# Patient Record
Sex: FEMALE | Race: WHITE | NOT HISPANIC OR LATINO | Employment: FULL TIME | URBAN - METROPOLITAN AREA
[De-identification: names, ages, dates, MRNs, and addresses within clinical notes are randomized per-mention and may not be internally consistent; named-entity substitution may affect disease eponyms.]

---

## 2021-06-11 ENCOUNTER — HOSPITAL ENCOUNTER (INPATIENT)
Facility: MEDICAL CENTER | Age: 51
LOS: 2 days | DRG: 175 | End: 2021-06-13
Attending: EMERGENCY MEDICINE | Admitting: INTERNAL MEDICINE
Payer: COMMERCIAL

## 2021-06-11 ENCOUNTER — APPOINTMENT (OUTPATIENT)
Dept: CARDIOLOGY | Facility: MEDICAL CENTER | Age: 51
DRG: 175 | End: 2021-06-11
Attending: EMERGENCY MEDICINE
Payer: COMMERCIAL

## 2021-06-11 ENCOUNTER — HOSPITAL ENCOUNTER (OUTPATIENT)
Dept: RADIOLOGY | Facility: MEDICAL CENTER | Age: 51
End: 2021-06-11
Payer: COMMERCIAL

## 2021-06-11 ENCOUNTER — APPOINTMENT (OUTPATIENT)
Dept: RADIOLOGY | Facility: MEDICAL CENTER | Age: 51
DRG: 175 | End: 2021-06-11
Attending: INTERNAL MEDICINE
Payer: COMMERCIAL

## 2021-06-11 DIAGNOSIS — I26.92 ACUTE SADDLE PULMONARY EMBOLISM, UNSPECIFIED WHETHER ACUTE COR PULMONALE PRESENT (HCC): ICD-10-CM

## 2021-06-11 DIAGNOSIS — K85.90 ACUTE PANCREATITIS, UNSPECIFIED COMPLICATION STATUS, UNSPECIFIED PANCREATITIS TYPE: ICD-10-CM

## 2021-06-11 PROBLEM — I82.432 ACUTE DEEP VEIN THROMBOSIS (DVT) OF POPLITEAL VEIN OF LEFT LOWER EXTREMITY (HCC): Status: ACTIVE | Noted: 2021-06-01

## 2021-06-11 LAB
APTT PPP: 31.8 SEC (ref 24.7–36)
EKG IMPRESSION: NORMAL
EST. AVERAGE GLUCOSE BLD GHB EST-MCNC: 200 MG/DL
GLUCOSE BLD-MCNC: 179 MG/DL (ref 65–99)
HBA1C MFR BLD: 8.6 % (ref 4–5.6)
INR PPP: 1.25 (ref 0.87–1.13)
LV EJECT FRACT  99904: 70
LV EJECT FRACT MOD 2C 99903: 82.75
LV EJECT FRACT MOD 4C 99902: 71.2
LV EJECT FRACT MOD BP 99901: 77.64
NT-PROBNP SERPL IA-MCNC: 17 PG/ML (ref 0–125)
PROTHROMBIN TIME: 15.4 SEC (ref 12–14.6)
TRIGL SERPL-MCNC: 237 MG/DL (ref 0–149)
UFH PPP CHRO-ACNC: >1.1 IU/ML

## 2021-06-11 PROCEDURE — 84478 ASSAY OF TRIGLYCERIDES: CPT

## 2021-06-11 PROCEDURE — 700102 HCHG RX REV CODE 250 W/ 637 OVERRIDE(OP): Performed by: INTERNAL MEDICINE

## 2021-06-11 PROCEDURE — 99223 1ST HOSP IP/OBS HIGH 75: CPT | Mod: 25 | Performed by: INTERNAL MEDICINE

## 2021-06-11 PROCEDURE — 93005 ELECTROCARDIOGRAM TRACING: CPT | Performed by: EMERGENCY MEDICINE

## 2021-06-11 PROCEDURE — A9270 NON-COVERED ITEM OR SERVICE: HCPCS | Performed by: INTERNAL MEDICINE

## 2021-06-11 PROCEDURE — 99291 CRITICAL CARE FIRST HOUR: CPT | Performed by: INTERNAL MEDICINE

## 2021-06-11 PROCEDURE — 700105 HCHG RX REV CODE 258: Performed by: INTERNAL MEDICINE

## 2021-06-11 PROCEDURE — 700117 HCHG RX CONTRAST REV CODE 255: Performed by: EMERGENCY MEDICINE

## 2021-06-11 PROCEDURE — 82962 GLUCOSE BLOOD TEST: CPT

## 2021-06-11 PROCEDURE — 85610 PROTHROMBIN TIME: CPT

## 2021-06-11 PROCEDURE — 770022 HCHG ROOM/CARE - ICU (200)

## 2021-06-11 PROCEDURE — 85730 THROMBOPLASTIN TIME PARTIAL: CPT | Mod: 91

## 2021-06-11 PROCEDURE — 93010 ELECTROCARDIOGRAM REPORT: CPT | Performed by: INTERNAL MEDICINE

## 2021-06-11 PROCEDURE — 93306 TTE W/DOPPLER COMPLETE: CPT

## 2021-06-11 PROCEDURE — 85520 HEPARIN ASSAY: CPT

## 2021-06-11 PROCEDURE — 96365 THER/PROPH/DIAG IV INF INIT: CPT

## 2021-06-11 PROCEDURE — 93306 TTE W/DOPPLER COMPLETE: CPT | Mod: 26 | Performed by: INTERNAL MEDICINE

## 2021-06-11 PROCEDURE — 700111 HCHG RX REV CODE 636 W/ 250 OVERRIDE (IP): Performed by: EMERGENCY MEDICINE

## 2021-06-11 PROCEDURE — 99291 CRITICAL CARE FIRST HOUR: CPT

## 2021-06-11 PROCEDURE — 96366 THER/PROPH/DIAG IV INF ADDON: CPT

## 2021-06-11 PROCEDURE — 83880 ASSAY OF NATRIURETIC PEPTIDE: CPT

## 2021-06-11 PROCEDURE — 83036 HEMOGLOBIN GLYCOSYLATED A1C: CPT

## 2021-06-11 PROCEDURE — 76705 ECHO EXAM OF ABDOMEN: CPT

## 2021-06-11 PROCEDURE — 96375 TX/PRO/DX INJ NEW DRUG ADDON: CPT

## 2021-06-11 RX ORDER — AMOXICILLIN 250 MG
2 CAPSULE ORAL 2 TIMES DAILY
Status: DISCONTINUED | OUTPATIENT
Start: 2021-06-11 | End: 2021-06-13 | Stop reason: HOSPADM

## 2021-06-11 RX ORDER — OXYCODONE HYDROCHLORIDE 10 MG/1
10 TABLET ORAL
Status: DISCONTINUED | OUTPATIENT
Start: 2021-06-11 | End: 2021-06-13 | Stop reason: HOSPADM

## 2021-06-11 RX ORDER — OXYCODONE HYDROCHLORIDE 5 MG/1
5 TABLET ORAL
Status: DISCONTINUED | OUTPATIENT
Start: 2021-06-11 | End: 2021-06-13 | Stop reason: HOSPADM

## 2021-06-11 RX ORDER — SODIUM CHLORIDE, SODIUM LACTATE, POTASSIUM CHLORIDE, CALCIUM CHLORIDE 600; 310; 30; 20 MG/100ML; MG/100ML; MG/100ML; MG/100ML
INJECTION, SOLUTION INTRAVENOUS CONTINUOUS
Status: DISCONTINUED | OUTPATIENT
Start: 2021-06-11 | End: 2021-06-12

## 2021-06-11 RX ORDER — HYDROMORPHONE HYDROCHLORIDE 1 MG/ML
0.5 INJECTION, SOLUTION INTRAMUSCULAR; INTRAVENOUS; SUBCUTANEOUS
Status: DISCONTINUED | OUTPATIENT
Start: 2021-06-11 | End: 2021-06-13 | Stop reason: HOSPADM

## 2021-06-11 RX ORDER — DEXTROSE MONOHYDRATE 25 G/50ML
50 INJECTION, SOLUTION INTRAVENOUS
Status: DISCONTINUED | OUTPATIENT
Start: 2021-06-11 | End: 2021-06-12

## 2021-06-11 RX ORDER — HEPARIN SODIUM 1000 [USP'U]/ML
40 INJECTION, SOLUTION INTRAVENOUS; SUBCUTANEOUS PRN
Status: ACTIVE | OUTPATIENT
Start: 2021-06-11 | End: 2021-06-12

## 2021-06-11 RX ORDER — HEPARIN SODIUM 5000 [USP'U]/100ML
0-30 INJECTION, SOLUTION INTRAVENOUS CONTINUOUS
Status: DISPENSED | OUTPATIENT
Start: 2021-06-11 | End: 2021-06-12

## 2021-06-11 RX ORDER — IBUPROFEN 200 MG
600 TABLET ORAL
COMMUNITY

## 2021-06-11 RX ORDER — ACETAMINOPHEN 325 MG/1
650 TABLET ORAL EVERY 6 HOURS PRN
Status: DISCONTINUED | OUTPATIENT
Start: 2021-06-11 | End: 2021-06-13 | Stop reason: HOSPADM

## 2021-06-11 RX ORDER — INSULIN LISPRO 100 [IU]/ML
1-6 INJECTION, SOLUTION INTRAVENOUS; SUBCUTANEOUS EVERY 6 HOURS
Status: DISCONTINUED | OUTPATIENT
Start: 2021-06-11 | End: 2021-06-12

## 2021-06-11 RX ORDER — BISACODYL 10 MG
10 SUPPOSITORY, RECTAL RECTAL
Status: DISCONTINUED | OUTPATIENT
Start: 2021-06-11 | End: 2021-06-13 | Stop reason: HOSPADM

## 2021-06-11 RX ORDER — HEPARIN SODIUM 1000 [USP'U]/ML
80 INJECTION, SOLUTION INTRAVENOUS; SUBCUTANEOUS ONCE
Status: COMPLETED | OUTPATIENT
Start: 2021-06-11 | End: 2021-06-11

## 2021-06-11 RX ORDER — POLYETHYLENE GLYCOL 3350 17 G/17G
1 POWDER, FOR SOLUTION ORAL
Status: DISCONTINUED | OUTPATIENT
Start: 2021-06-11 | End: 2021-06-13 | Stop reason: HOSPADM

## 2021-06-11 RX ADMIN — HEPARIN SODIUM 10.94 UNITS/KG/HR: 5000 INJECTION, SOLUTION INTRAVENOUS at 15:42

## 2021-06-11 RX ADMIN — SODIUM CHLORIDE, POTASSIUM CHLORIDE, SODIUM LACTATE AND CALCIUM CHLORIDE: 600; 310; 30; 20 INJECTION, SOLUTION INTRAVENOUS at 22:44

## 2021-06-11 RX ADMIN — LIDOCAINE HYDROCHLORIDE 15 ML: 20 SOLUTION OROPHARYNGEAL at 17:12

## 2021-06-11 RX ADMIN — HEPARIN SODIUM 6600 UNITS: 1000 INJECTION, SOLUTION INTRAVENOUS; SUBCUTANEOUS at 15:45

## 2021-06-11 RX ADMIN — HUMAN ALBUMIN MICROSPHERES AND PERFLUTREN 3 ML: 10; .22 INJECTION, SOLUTION INTRAVENOUS at 16:15

## 2021-06-11 ASSESSMENT — ENCOUNTER SYMPTOMS
RESPIRATORY NEGATIVE: 1
CONSTITUTIONAL NEGATIVE: 1
TINGLING: 1
PALPITATIONS: 1
ABDOMINAL PAIN: 0
EYES NEGATIVE: 1
ORTHOPNEA: 0
BRUISES/BLEEDS EASILY: 0
HEARTBURN: 1
CHILLS: 0
VOMITING: 0
WEAKNESS: 0
CONSTIPATION: 0
FEVER: 0
PSYCHIATRIC NEGATIVE: 1
HEADACHES: 1
DIZZINESS: 1
GASTROINTESTINAL NEGATIVE: 1
MUSCULOSKELETAL NEGATIVE: 1
SEIZURES: 0
NAUSEA: 1

## 2021-06-11 ASSESSMENT — LIFESTYLE VARIABLES
DOES PATIENT WANT TO STOP DRINKING: NO
DO YOU DRINK ALCOHOL: NO

## 2021-06-11 ASSESSMENT — FIBROSIS 4 INDEX
FIB4 SCORE: 0.89
FIB4 SCORE: 0.73

## 2021-06-11 NOTE — H&P
Hospital Medicine History & Physical Note    Date of Service  6/11/2021    Primary Care Physician  QUINTON Palencia.    Consultants  Critical Care Dr. Garay  IR Dr. Keen    Code Status  Full Code    Chief Complaint  Chief Complaint   Patient presents with   • Pulmonary Embolism     BIB EMS from [facility] for a saddle PE. c/o SOB, tingling in left hand and right foot.        History of Presenting Illness  50 y.o. female who presented 6/11/2021 with history of endometriosis and hysterectomy in April 2021, LLE DVT diagnosed 5 days ago and compliant with Eliquis, who presented to the ED with sudden onset lightheadedness since this afternoon.   She initially presented to outside facility and underwent extensive work-up.  CTA revealed saddle pulmonary embolism with imaging evidence of right heart strain.  She was transported to [facility] ED for higher level of care.  Echocardiogram being performed in the emergency department. Dr. Keen, IR evaluating.    Her lightheadedness occurred suddenly while walking this afternoon.  It was accompanied by nausea, tingling in extremities, and headache which has now resolved.  Symptoms were worse with standing, activity, improved with sitting down.  She denies fall or loss of consciousness.     She reports that she was vaccinated for Covid approximately 1 month ago.      Review of Systems  Review of Systems   Constitutional: Positive for malaise/fatigue. Negative for chills and fever.   Cardiovascular: Positive for palpitations and leg swelling (left). Negative for chest pain and orthopnea.   Gastrointestinal: Positive for heartburn and nausea. Negative for abdominal pain, constipation and vomiting.   Neurological: Positive for dizziness and headaches. Negative for seizures and weakness.   Endo/Heme/Allergies: Does not bruise/bleed easily.   All other systems reviewed and are negative.      Past Medical History   has a past medical history of Diabetes (HCC), DVT (deep venous thrombosis)  ([facility]) (06/2021), Endometriosis, and Hypertension.    Surgical History   has a past surgical history that includes gyn surgery (04/2021); other orthopedic surgery; and other orthopedic surgery.     Family History  family history includes Cancer in her maternal grandmother; Diabetes in her brother, father, and maternal grandfather; Heart Disease in her paternal grandfather.     Social History   reports that she has never smoked. She has never used smokeless tobacco. She reports current alcohol use. She reports previous drug use.    Allergies  No Known Allergies    Medications  Prior to Admission Medications   Prescriptions Last Dose Informant Patient Reported? Taking?   Empagliflozin-metFORMIN HCl 12.5-500 MG Tab   Yes No   Sig: Take 1 Tablet 24 Hour Sustained Release by mouth every day.   SITagliptin (JANUVIA) 50 MG Tab   Yes No   Sig: Take 50 mg by mouth every day.   amLODIPine (NORVASC) 5 MG Tab   Yes No   Sig: Take 2.5 mg by mouth at bedtime.   apixaban (ELIQUIS) 5mg Tab   Yes No   Sig: Take 10 mg by mouth 2 times a day.   fenofibrate (TRICOR) 145 MG Tab   Yes No   Sig: Take 1 tablet by mouth at bedtime.   irbesartan (AVAPRO) 150 MG Tab   Yes No   Sig: Take 300 mg by mouth at bedtime.      Facility-Administered Medications: None       Physical Exam  Temp:  [36.3 °C (97.3 °F)] 36.3 °C (97.3 °F)  Pulse:  [] 108  Resp:  [16-20] 16  BP: (130-152)/(69-90) 152/89  SpO2:  [91 %-95 %] 94 %    Physical Exam  Vitals and nursing note reviewed.   Constitutional:       General: She is not in acute distress.     Appearance: She is obese. She is not ill-appearing or diaphoretic.   HENT:      Head: Normocephalic and atraumatic.      Right Ear: External ear normal.      Left Ear: External ear normal.      Nose: Nose normal.      Mouth/Throat:      Mouth: Mucous membranes are moist.      Pharynx: Oropharynx is clear.   Eyes:      General: No scleral icterus.     Conjunctiva/sclera: Conjunctivae normal.   Cardiovascular:       Rate and Rhythm: Regular rhythm. Tachycardia present.      Heart sounds: Murmur heard.   Systolic murmur is present.     Pulmonary:      Effort: Pulmonary effort is normal. No respiratory distress.      Breath sounds: Normal breath sounds. No wheezing or rales.   Chest:      Chest wall: No tenderness.   Abdominal:      General: There is no distension.      Palpations: Abdomen is soft.      Tenderness: There is no abdominal tenderness. There is no guarding.   Musculoskeletal:         General: No tenderness.      Cervical back: Normal range of motion and neck supple.      Right lower leg: Edema (mild) present.   Skin:     General: Skin is warm and dry.   Neurological:      General: No focal deficit present.      Mental Status: She is alert and oriented to person, place, and time.      Cranial Nerves: No cranial nerve deficit.      Motor: No weakness.   Psychiatric:         Mood and Affect: Mood normal.         Behavior: Behavior normal.         Laboratory:  Recent Labs     06/11/21  1154   WBC 9.0   RBC 5.40   HEMOGLOBIN 15.1   HEMATOCRIT 45.8   MCV 84.8   MCH 28.0   MCHC 33.0   RDW 12.7   PLATELETCT 271   MPV 9.5     Recent Labs     06/11/21  1154   SODIUM 139   POTASSIUM 3.5   CHLORIDE 102   CO2 24   GLUCOSE 220*   BUN 11   CREATININE 1.2*   CALCIUM 9.2     Recent Labs     06/11/21  1154   ALTSGPT 54   ASTSGOT 36   ALKPHOSPHAT 121*   TBILIRUBIN 0.6   LIPASE 1417*   GLUCOSE 220*         No results for input(s): NTPROBNP in the last 72 hours.      No results for input(s): TROPONINT in the last 72 hours.    Imaging:  EC-ECHOCARDIOGRAM COMPLETE W/ CONT   Final Result      OUTSIDE IMAGES-MR BRAIN   Final Result      OUTSIDE IMAGES-CT CHEST   Final Result      OUTSIDE IMAGES-CT ABDOMEN /PELVIS   Final Result      OUTSIDE IMAGES-CT HEAD   Final Result      OUTSIDE IMAGES-DX CHEST   Final Result            Assessment/Plan:  I anticipate this patient will require at least two midnights for appropriate medical management,  necessitating inpatient admission.    * Acute saddle pulmonary embolism (HCC)- (present on admission)  Assessment & Plan  Became symptomatic this afternoon, prompting presentation.  Evidence of right heart strain on imaging.  Echocardiogram pending.  Admit to ICU for close hemodynamic monitoring, possible EKOS.  Continue therapeutic anticoagulation.  Appreciate IR, intensivist recommendations.    Acute deep vein thrombosis (DVT) of popliteal vein of left lower extremity (HCC)- (present on admission)  Assessment & Plan  Left lower leg DVT diagnosed in 6/2021.  Neurovascularly intact.  Has been taking Eliquis consistently since diagnosis.  Continue therapeutic anticoagulation.  Monitor.    Elevated lipase- (present on admission)  Assessment & Plan  No epigastric tenderness or imaging evidence of pancreatitis, therefore does not meet criteria for diagnosis of pancreatitis.  No known gallstone disease, very rare EtOH.  Check triglycerides.  Trend lipase tomorrow morning.    Hypertension- (present on admission)  Assessment & Plan  Mildly hypertensive on presentation.  Holding home antihypertensives pending PE plan.    Type 2 diabetes mellitus with hyperglycemia, without long-term current use of insulin (HCC)- (present on admission)  Assessment & Plan  Hyperglycemic since presentation.  Most recent hemoglobin A1c was 8.8, is outdated.  Does not take insulin at home.  Oral medications only.  Hold oral glycemic medications in the acute setting.  ISS, Accu-Cheks, hypoglycemia protocol.  Check hemoglobin A1c.    Nausea- (present on admission)  Assessment & Plan  Without vomiting.  GI cocktail ordered.  As needed antiemetics.    Class 2 severe obesity due to excess calories with serious comorbidity and body mass index (BMI) of 36.0 to 36.9 in adult (HCC)- (present on admission)  Assessment & Plan  Body mass index is 36.87 kg/m².   No acute intervention indicated.  Recommend lifestyle interventions and outpatient  follow-up.    Endometriosis- (present on admission)  Assessment & Plan  S/p recent hysterectomy. Extensive, known adhesive disease.

## 2021-06-11 NOTE — ASSESSMENT & PLAN NOTE
Left lower leg DVT diagnosed on 6/7/2021.  Neurovascularly intact.  Has been taking Eliquis consistently since diagnosis.  Continue therapeutic anticoagulation.  Monitor.

## 2021-06-11 NOTE — ASSESSMENT & PLAN NOTE
Hyperglycemic since presentation.  Most recent hemoglobin A1c was 8.8, is outdated.  Does not take insulin at home.  Oral medications only.  Hold oral glycemic medications in the acute setting.  ISS, Accu-Cheks, hypoglycemia protocol.  Check hemoglobin A1c.

## 2021-06-11 NOTE — ED NOTES
Med Rec completed: per pt at bedside with visitor present.     No ORAL antibiotics in last 14 days    [facility]      Pt confirmed following allergies:  No Known Allergies     Pt's home medications:   Medication Sig   • apixaban (ELIQUIS) 5mg Tab Take 10 mg by mouth 2 times a day. 10mg = 2 tabs   • Empagliflozin-metFORMIN HCl 12.5-500 MG Tab Take 1 Tablet 24 Hour Sustained Release by mouth every day.   • irbesartan (AVAPRO) 300 MG Tab Take 300 mg by mouth at bedtime.   • fenofibrate (TRICOR) 145 MG Tab Take 1 tablet by mouth at bedtime.   • amLODIPine (NORVASC) 2.5 MG Tab Take 2.5 mg by mouth at bedtime.   • SITagliptin (JANUVIA) 50 MG Tab Take 50 mg by mouth every day.   • ibuprofen (MOTRIN IB) 200 MG Tab Take 600 mg by mouth one time as needed for Moderate Pain.

## 2021-06-11 NOTE — ASSESSMENT & PLAN NOTE
Body mass index is 36.87 kg/m².   No acute intervention indicated.  Recommend lifestyle interventions and outpatient follow-up.

## 2021-06-11 NOTE — ED TRIAGE NOTES
"..  Chief Complaint   Patient presents with   • Pulmonary Embolism     [facility]. c/o SOB, tingling in left hand and right foot.          ../89   Pulse (!) 108   Temp 36.3 °C (97.3 °F) (Temporal)   Resp 16   Ht 1.727 m (5' 8\")   Wt 110 kg (242 lb 8.1 oz)   SpO2 94%            "

## 2021-06-11 NOTE — ED PROVIDER NOTES
ED Provider Note    Scribed for Boston Spicer M.D. by Hansa Chance. 6/11/2021  3:03 PM    Primary care provider: МАРИНА Palencia  Means of arrival: EMS  History obtained from: Patient and EMS  History limited by: None    CHIEF COMPLAINT  Chief Complaint   Patient presents with    Pulmonary Embolism     [place] [facility] [place]. c/o SOB, tingling in left hand and right foot.        HPI  Vidya Bennett is a 50 y.o. female with a prior history of hypertension. She presents to the Emergency Department for evaluation of a saddle pulmonary embolism found earlier today on scan. The patient had a hysterectomy performed 7 weeks ago. On Monday, she was found to have a DVT. She was discharged with a regimen of Eliquis that she has been compliant with 2x a day. She is currently experiencing associated acute headache, nausea, and tingling to the right hand and foot. She denies any chest pain, shortness of breath, or heart related symptoms. She did experience a brief amount of shortness of breath following her hysterectomy, but that has since subsided. She denies alcohol consumption.    REVIEW OF SYSTEMS  Pertinent positives include headache, nausea, tingling in the right hand and foot. Pertinent negatives include no chest pain, shortness of breath, heart related symptoms.  All other systems reviewed and negative.    PAST MEDICAL HISTORY   has a past medical history of Diabetes ([facility]), DVT (deep venous thrombosis) ([facility]) (06/2021), Endometriosis, and Hypertension.    SURGICAL HISTORY   has a past surgical history that includes gyn surgery (04/2021); other orthopedic surgery; and other orthopedic surgery.    SOCIAL HISTORY  Social History     Tobacco Use    Smoking status: Never Smoker    Smokeless tobacco: Never Used   Vaping Use    Vaping Use: Never used   Substance Use Topics    Alcohol use: Yes     Comment: rare    Drug use: Not Currently      Social History     Substance and Sexual Activity   Drug Use  "Not Currently       FAMILY HISTORY  None pertinent    CURRENT MEDICATIONS  Home Medications       Reviewed by Andrew Roberts (Pharmacy Tech) on 06/11/21 at 1549  Med List Status: Complete     Medication Last Dose Status   amLODIPine (NORVASC) 2.5 MG Tab 6/10/2021 Active   apixaban (ELIQUIS) 5mg Tab 6/11/2021 Active   Empagliflozin-metFORMIN HCl 12.5-500 MG Tab 6/11/2021 Active   fenofibrate (TRICOR) 145 MG Tab 6/10/2021 Active   ibuprofen (MOTRIN IB) 200 MG Tab 6/11/2021 Active   irbesartan (AVAPRO) 300 MG Tab 6/10/2021 Active   SITagliptin (JANUVIA) 50 MG Tab 6/11/2021 Active                    ALLERGIES  No Known Allergies    PHYSICAL EXAM  VITAL SIGNS: /89   Pulse (!) 108   Temp 36.3 °C (97.3 °F) (Temporal)   Resp 16   Ht 1.727 m (5' 8\")   Wt 110 kg (242 lb 8.1 oz)   SpO2 94%   BMI 36.87 kg/m²     Constitutional: Well developed, Well nourished, mild distress, Non-toxic appearance.   HENT: Normocephalic, Atraumatic, Bilateral external ears normal, Oropharynx moist, No oral exudates.   Eyes: PERRLA, EOMI, Conjunctiva normal, No discharge.   Neck: No tenderness, Supple, No stridor.   Lymphatic: No lymphadenopathy noted.   Cardiovascular: Normal heart rate, Normal rhythm.   Thorax & Lungs: Clear to auscultation bilaterally, No respiratory distress, No wheezing, No crackles.   Abdomen: Soft, mild periumbilical tenderness to palpation. No masses, No pulsatile masses.   Skin: Warm, Dry, No erythema, No rash.   Extremities:, No edema No cyanosis.   Musculoskeletal: No tenderness to palpation or major deformities noted.  Intact distal pulses  Neurologic: Awake, alert. Moves all extremities spontaneously.  Psychiatric: Affect normal, Judgment normal, Mood normal.     LABS  Results for orders placed or performed during the hospital encounter of 06/11/21   EC-ECHOCARDIOGRAM COMPLETE W/ CONT   Result Value Ref Range    Eject.Frac. MOD BP 77.64     Eject.Frac. MOD 4C 71.2     Eject.Frac. MOD 2C 82.75     " Left Ventrical Ejection Fraction 70    aPTT   Result Value Ref Range    APTT 31.8 24.7 - 36.0 sec   Prothrombin Time   Result Value Ref Range    PT 15.4 (H) 12.0 - 14.6 sec    INR 1.25 (H) 0.87 - 1.13   Heparin Xa (Unfractionated)   Result Value Ref Range    Heparin Xa (UFH) >1.10 (HH) IU/mL   HEMOGLOBIN A1C   Result Value Ref Range    Glycohemoglobin 8.6 (H) 4.0 - 5.6 %    Est Avg Glucose 200 mg/dL   proBrain Natriuretic Peptide, NT   Result Value Ref Range    NT-proBNP 17 0 - 125 pg/mL        RADIOLOGY  EC-ECHOCARDIOGRAM COMPLETE W/ CONT   Final Result      OUTSIDE IMAGES-MR BRAIN   Final Result      OUTSIDE IMAGES-CT CHEST   Final Result      OUTSIDE IMAGES-CT ABDOMEN /PELVIS   Final Result      OUTSIDE IMAGES-CT HEAD   Final Result      OUTSIDE IMAGES-DX CHEST   Final Result        The radiologist's interpretation of all radiological studies have been reviewed by me.      COURSE & MEDICAL DECISION MAKING  Pertinent Labs & Imaging studies reviewed. (See chart for details)    I reviewed the patient's medical records which showed the patient has a history of hypertension and a recent DVT (Monday 6.7.2021). She has been taking Eliquis since. She was seen earlier today at [facility] where she was found to have a saddle pulmonary embolism.    3:03 PM - Patient seen and examined at bedside. Ordered Echocardiogram, aPTT, Prothrombin Time, and Heparin to evaluate her symptoms.     3:27 PM I discussed the patient's case and the above findings with IR who advises that I get more information from the Echocardiogram, then talk to inventivist to determine if the patient needs EKOS.     3:31 PM- Paged Pulmonary.    3:39 PM I discussed the patient's case and the above findings with Dr. Dias (hospitalist) who agrees to hospitalize the patient and take over plan of care moving forward.     4:21 PM I discussed the patient's case and the above findings with Dr. Garay (intensivist) who agrees to come evaluate the patient.     CRITICAL  CARE  The very real possibilty of a deterioration of this patient's condition required the highest level of my preparedness for sudden, emergent intervention.  I provided critical care services, which included medication orders, frequent reevaluations of the patient's condition and response to treatment, ordering and reviewing test results, and discussing the case with various consultants.  The critical care time associated with the care of the patient was 35 minutes. Review chart for interventions. This time is exclusive of any other billable procedures.     Decision Making:  Patient transferred from outside facility secondary to a saddle pulmonary embolism, fortunately the patient is having minimal symptoms and she is hemodynamically stable, got an echocardiogram, discussed the case with interventional radiology, the intensivist, the hospitalist for admission to the hospital.    DISPOSITION:  Patient will be hospitalized by Dr. Arun mcintosh in guarded condition.    FINAL IMPRESSION  1. Acute saddle pulmonary embolism, unspecified whether acute cor pulmonale present (HCC)    2. Acute pancreatitis, unspecified complication status, unspecified pancreatitis type          I, Hansa Chance ([place]), am scribing for, and in the presence of, Boston Spicer M.D..    Electronically signed by: Hansa Chance ([place]), 6/11/2021    I, Boston Spicer M.D. personally performed the services described in this documentation, as scribed by Hansa Chance in my presence, and it is both accurate and complete.    The note accurately reflects work and decisions made by me.  Boston Spicer M.D.  6/11/2021  6:17 PM

## 2021-06-11 NOTE — ASSESSMENT & PLAN NOTE
No epigastric tenderness or imaging evidence of pancreatitis, therefore does not meet criteria for diagnosis of pancreatitis.  No known gallstone disease, very rare EtOH.  Check triglycerides.  Trend lipase tomorrow morning.

## 2021-06-12 PROBLEM — Z87.42 HISTORY OF ENDOMETRIOSIS: Status: ACTIVE | Noted: 2021-06-12

## 2021-06-12 LAB
ALBUMIN SERPL BCP-MCNC: 4 G/DL (ref 3.2–4.9)
ALBUMIN/GLOB SERPL: 1.4 G/DL
ALP SERPL-CCNC: 85 U/L (ref 30–99)
ALT SERPL-CCNC: 36 U/L (ref 2–50)
ANION GAP SERPL CALC-SCNC: 10 MMOL/L (ref 7–16)
APTT PPP: 55.3 SEC (ref 24.7–36)
AST SERPL-CCNC: 22 U/L (ref 12–45)
BASOPHILS # BLD AUTO: 0.4 % (ref 0–1.8)
BASOPHILS # BLD: 0.03 K/UL (ref 0–0.12)
BILIRUB SERPL-MCNC: 0.4 MG/DL (ref 0.1–1.5)
BUN SERPL-MCNC: 9 MG/DL (ref 8–22)
CALCIUM SERPL-MCNC: 8.9 MG/DL (ref 8.5–10.5)
CHLORIDE SERPL-SCNC: 105 MMOL/L (ref 96–112)
CHOLEST SERPL-MCNC: 146 MG/DL (ref 100–199)
CO2 SERPL-SCNC: 23 MMOL/L (ref 20–33)
CREAT SERPL-MCNC: 0.65 MG/DL (ref 0.5–1.4)
EOSINOPHIL # BLD AUTO: 0.35 K/UL (ref 0–0.51)
EOSINOPHIL NFR BLD: 5 % (ref 0–6.9)
ERYTHROCYTE [DISTWIDTH] IN BLOOD BY AUTOMATED COUNT: 39.9 FL (ref 35.9–50)
GLOBULIN SER CALC-MCNC: 2.9 G/DL (ref 1.9–3.5)
GLUCOSE BLD-MCNC: 126 MG/DL (ref 65–99)
GLUCOSE BLD-MCNC: 186 MG/DL (ref 65–99)
GLUCOSE BLD-MCNC: 191 MG/DL (ref 65–99)
GLUCOSE BLD-MCNC: 85 MG/DL (ref 65–99)
GLUCOSE SERPL-MCNC: 146 MG/DL (ref 65–99)
HCT VFR BLD AUTO: 42.3 % (ref 37–47)
HDLC SERPL-MCNC: 34 MG/DL
HGB BLD-MCNC: 13.6 G/DL (ref 12–16)
IMM GRANULOCYTES # BLD AUTO: 0.03 K/UL (ref 0–0.11)
IMM GRANULOCYTES NFR BLD AUTO: 0.4 % (ref 0–0.9)
INR PPP: 1.28 (ref 0.87–1.13)
LDLC SERPL CALC-MCNC: 43 MG/DL
LIPASE SERPL-CCNC: 58 U/L (ref 11–82)
LYMPHOCYTES # BLD AUTO: 1.98 K/UL (ref 1–4.8)
LYMPHOCYTES NFR BLD: 28.3 % (ref 22–41)
MCH RBC QN AUTO: 27.5 PG (ref 27–33)
MCHC RBC AUTO-ENTMCNC: 32.2 G/DL (ref 33.6–35)
MCV RBC AUTO: 85.6 FL (ref 81.4–97.8)
MONOCYTES # BLD AUTO: 0.51 K/UL (ref 0–0.85)
MONOCYTES NFR BLD AUTO: 7.3 % (ref 0–13.4)
NEUTROPHILS # BLD AUTO: 4.09 K/UL (ref 2–7.15)
NEUTROPHILS NFR BLD: 58.6 % (ref 44–72)
NRBC # BLD AUTO: 0 K/UL
NRBC BLD-RTO: 0 /100 WBC
PLATELET # BLD AUTO: 246 K/UL (ref 164–446)
PMV BLD AUTO: 9.6 FL (ref 9–12.9)
POTASSIUM SERPL-SCNC: 3.8 MMOL/L (ref 3.6–5.5)
PROT SERPL-MCNC: 6.9 G/DL (ref 6–8.2)
PROTHROMBIN TIME: 15.6 SEC (ref 12–14.6)
RBC # BLD AUTO: 4.94 M/UL (ref 4.2–5.4)
SODIUM SERPL-SCNC: 138 MMOL/L (ref 135–145)
TRIGL SERPL-MCNC: 347 MG/DL (ref 0–149)
UFH PPP CHRO-ACNC: >1.1 IU/ML
UFH PPP CHRO-ACNC: >1.1 IU/ML
WBC # BLD AUTO: 7 K/UL (ref 4.8–10.8)

## 2021-06-12 PROCEDURE — 85025 COMPLETE CBC W/AUTO DIFF WBC: CPT

## 2021-06-12 PROCEDURE — 99233 SBSQ HOSP IP/OBS HIGH 50: CPT | Performed by: HOSPITALIST

## 2021-06-12 PROCEDURE — A9270 NON-COVERED ITEM OR SERVICE: HCPCS | Performed by: INTERNAL MEDICINE

## 2021-06-12 PROCEDURE — 99233 SBSQ HOSP IP/OBS HIGH 50: CPT | Performed by: INTERNAL MEDICINE

## 2021-06-12 PROCEDURE — 82962 GLUCOSE BLOOD TEST: CPT | Mod: 91

## 2021-06-12 PROCEDURE — 700102 HCHG RX REV CODE 250 W/ 637 OVERRIDE(OP): Performed by: HOSPITALIST

## 2021-06-12 PROCEDURE — 80061 LIPID PANEL: CPT

## 2021-06-12 PROCEDURE — 83690 ASSAY OF LIPASE: CPT

## 2021-06-12 PROCEDURE — 770020 HCHG ROOM/CARE - TELE (206)

## 2021-06-12 PROCEDURE — 85520 HEPARIN ASSAY: CPT

## 2021-06-12 PROCEDURE — 700111 HCHG RX REV CODE 636 W/ 250 OVERRIDE (IP): Performed by: HOSPITALIST

## 2021-06-12 PROCEDURE — 700102 HCHG RX REV CODE 250 W/ 637 OVERRIDE(OP): Performed by: INTERNAL MEDICINE

## 2021-06-12 PROCEDURE — 80053 COMPREHEN METABOLIC PANEL: CPT

## 2021-06-12 RX ORDER — INSULIN LISPRO 100 [IU]/ML
1-6 INJECTION, SOLUTION INTRAVENOUS; SUBCUTANEOUS
Status: DISCONTINUED | OUTPATIENT
Start: 2021-06-12 | End: 2021-06-13 | Stop reason: HOSPADM

## 2021-06-12 RX ORDER — DEXTROSE MONOHYDRATE 25 G/50ML
50 INJECTION, SOLUTION INTRAVENOUS
Status: DISCONTINUED | OUTPATIENT
Start: 2021-06-12 | End: 2021-06-13 | Stop reason: HOSPADM

## 2021-06-12 RX ADMIN — ACETAMINOPHEN 650 MG: 325 TABLET, FILM COATED ORAL at 17:51

## 2021-06-12 RX ADMIN — ENOXAPARIN SODIUM 100 MG: 100 INJECTION SUBCUTANEOUS at 16:11

## 2021-06-12 RX ADMIN — INSULIN LISPRO 1 UNITS: 100 INJECTION, SOLUTION INTRAVENOUS; SUBCUTANEOUS at 21:02

## 2021-06-12 RX ADMIN — OXYCODONE 5 MG: 5 TABLET ORAL at 21:40

## 2021-06-12 RX ADMIN — HEPARIN SODIUM 12 UNITS/KG/HR: 5000 INJECTION, SOLUTION INTRAVENOUS at 14:15

## 2021-06-12 RX ADMIN — INSULIN LISPRO 1 UNITS: 100 INJECTION, SOLUTION INTRAVENOUS; SUBCUTANEOUS at 17:51

## 2021-06-12 ASSESSMENT — COGNITIVE AND FUNCTIONAL STATUS - GENERAL
SUGGESTED CMS G CODE MODIFIER MOBILITY: CH
SUGGESTED CMS G CODE MODIFIER DAILY ACTIVITY: CH
MOBILITY SCORE: 24
DAILY ACTIVITIY SCORE: 24

## 2021-06-12 ASSESSMENT — ENCOUNTER SYMPTOMS
CHILLS: 0
HEARTBURN: 0
HEMOPTYSIS: 0
RESPIRATORY NEGATIVE: 1
BLOOD IN STOOL: 0
ABDOMINAL PAIN: 0
EYES NEGATIVE: 1
HEADACHES: 0
COUGH: 0
NERVOUS/ANXIOUS: 0
DIZZINESS: 0
MUSCULOSKELETAL NEGATIVE: 1
FEVER: 0
POLYDIPSIA: 0
WEAKNESS: 0
NECK PAIN: 0
MYALGIAS: 1
NAUSEA: 0
CARDIOVASCULAR NEGATIVE: 1
GASTROINTESTINAL NEGATIVE: 1
NEUROLOGICAL NEGATIVE: 1
NERVOUS/ANXIOUS: 1
BACK PAIN: 0
SPUTUM PRODUCTION: 0
BRUISES/BLEEDS EASILY: 0
ORTHOPNEA: 0
VOMITING: 0
CONSTITUTIONAL NEGATIVE: 1
SHORTNESS OF BREATH: 0
PND: 0
FOCAL WEAKNESS: 0
PALPITATIONS: 0
DEPRESSION: 0

## 2021-06-12 ASSESSMENT — PAIN DESCRIPTION - PAIN TYPE
TYPE: ACUTE PAIN

## 2021-06-12 ASSESSMENT — PATIENT HEALTH QUESTIONNAIRE - PHQ9
SUM OF ALL RESPONSES TO PHQ9 QUESTIONS 1 AND 2: 0
1. LITTLE INTEREST OR PLEASURE IN DOING THINGS: NOT AT ALL
2. FEELING DOWN, DEPRESSED, IRRITABLE, OR HOPELESS: NOT AT ALL

## 2021-06-12 ASSESSMENT — FIBROSIS 4 INDEX: FIB4 SCORE: 0.73

## 2021-06-12 NOTE — PROGRESS NOTES
[facility] Medicine Daily Progress Note    Date of Service  6/12/2021    Chief Complaint  50 y.o. female admitted 6/11/2021 with recent diagnosis of a DVT after hysterectomy and on hormonal treatment at the time.  Last Monday she was diagnosed with a left extensive lower extremity DVT reaching into the groin.  The patient was started on Eliquis, discharged, presents back with not feeling well, nausea, dizziness and tingling in the left hand the left foot.  The patient now diagnosed with extensive pulmonary embolus, saddle embolus.    Hospital Course  50-year-old female with recent hysterectomy, on hormonal treatment, diagnosed with DVT, PE  Did not qualify for thrombolytics or EKOS, admitted to ICU with heparin drip and further monitoring.  On admission the patient had elevated lipase without CT findings of pancreatitis, follow-up labs normalized.    Interval Problem Update  Patient seen and examined today.    Patient tolerating treatment and therapies.  All Data, Medication data reviewed.  Case discussed with nursing as available.  Plan of Care reviewed with patient and notified of changes.  6/12 the patient feels much better, she does have some fullness in the left lower extremity but denies pain, no chest pain, no dyspnea, no palpitations, no hemoptysis, not a bleeding complications, remains on heparin drip.  Laboratory follow-up fairly benign, her right upper quadrant ultrasound was negative except for some hepatic steatosis.    Consultants/Specialty  Pulmonary critical care    Code Status  Full Code    Disposition  To telemetry, likely home in 1 to 2 days if further benign progression    Review of Systems  Review of Systems   Constitutional: Negative.  Negative for chills and fever.   HENT: Negative.    Eyes: Negative.    Respiratory: Negative.  Negative for cough.    Cardiovascular: Negative.  Negative for chest pain and palpitations.   Gastrointestinal: Negative.  Negative for heartburn, nausea and vomiting.    Genitourinary: Negative.  Negative for dysuria and frequency.   Musculoskeletal: Positive for myalgias. Negative for back pain and neck pain.   Skin: Negative.  Negative for itching and rash.   Neurological: Negative.  Negative for dizziness, focal weakness, weakness and headaches.   Endo/Heme/Allergies: Negative.  Negative for polydipsia. Does not bruise/bleed easily.   Psychiatric/Behavioral: Negative for depression. The patient is nervous/anxious.         Physical Exam  Temp:  [35.9 °C (96.6 °F)-36.3 °C (97.3 °F)] 35.9 °C (96.6 °F)  Pulse:  [] 91  Resp:  [15-28] 22  BP: (110-152)/(61-89) 140/79  SpO2:  [89 %-97 %] 93 %    Physical Exam  Vitals and nursing note reviewed.   Constitutional:       Appearance: She is well-developed. She is not diaphoretic.   HENT:      Head: Normocephalic and atraumatic.      Nose: Nose normal.   Eyes:      Conjunctiva/sclera: Conjunctivae normal.      Pupils: Pupils are equal, round, and reactive to light.   Neck:      Thyroid: No thyromegaly.      Vascular: No JVD.   Cardiovascular:      Rate and Rhythm: Normal rate and regular rhythm.      Heart sounds: Normal heart sounds. No friction rub. No gallop.    Pulmonary:      Effort: Pulmonary effort is normal.      Breath sounds: Normal breath sounds. No wheezing or rales.   Abdominal:      General: Bowel sounds are normal. There is no distension.      Palpations: Abdomen is soft. There is no mass.      Tenderness: There is no abdominal tenderness. There is no guarding or rebound.   Musculoskeletal:         General: No tenderness. Normal range of motion.      Cervical back: Normal range of motion and neck supple.      Comments: Mild tenderness in the left medial thigh   Lymphadenopathy:      Cervical: No cervical adenopathy.   Skin:     General: Skin is warm and dry.   Neurological:      Mental Status: She is alert and oriented to person, place, and time.      Cranial Nerves: No cranial nerve deficit.   Psychiatric:          Behavior: Behavior normal.         Fluids    Intake/Output Summary (Last 24 hours) at 6/12/2021 1140  Last data filed at 6/12/2021 0600  Gross per 24 hour   Intake 996.36 ml   Output 900 ml   Net 96.36 ml       Laboratory  Recent Labs     06/11/21  1154 06/12/21  0519   WBC 9.0 7.0   RBC 5.40 4.94   HEMOGLOBIN 15.1 13.6   HEMATOCRIT 45.8 42.3   MCV 84.8 85.6   MCH 28.0 27.5   MCHC 33.0 32.2*   RDW 12.7 39.9   PLATELETCT 271 246   MPV 9.5 9.6     Recent Labs     06/11/21  1154 06/12/21  0519   SODIUM 139 138   POTASSIUM 3.5 3.8   CHLORIDE 102 105   CO2 24 23   GLUCOSE 220* 146*   BUN 11 9   CREATININE 1.2* 0.65   CALCIUM 9.2 8.9     Recent Labs     06/11/21  1525 06/11/21  2046   APTT 31.8 55.3*   INR 1.25* 1.28*         Recent Labs     06/11/21  2046 06/12/21  0519   TRIGLYCERIDE 237* 347*   HDL  --  34*   LDL  --  43       Imaging  US-RUQ   Final Result      1.  No acute sonographic abnormality. No gallstones.   2.  Hepatic steatosis.      EC-ECHOCARDIOGRAM COMPLETE W/ CONT   Final Result      OUTSIDE IMAGES-MR BRAIN   Final Result      OUTSIDE IMAGES-CT CHEST   Final Result      OUTSIDE IMAGES-CT ABDOMEN /PELVIS   Final Result      OUTSIDE IMAGES-CT HEAD   Final Result      OUTSIDE IMAGES-DX CHEST   Final Result           Assessment/Plan  * Acute saddle pulmonary embolism (HCC)- (present on admission)  Assessment & Plan  Became symptomatic , prompting presentation.  Evidence of right heart strain on imaging.  Echocardiogram pending.  So far did well, no oxygen needs, continue anticoagulation,    Acute pancreatitis- (present on admission)  Assessment & Plan  Question of true diagnosis, lipase level normalized rapidly, no symptoms    Elevated serum creatinine- (present on admission)  Assessment & Plan  Improved with medical treatment, monitor    Class 2 severe obesity due to excess calories with serious comorbidity and body mass index (BMI) of 36.0 to 36.9 in adult (HCC)- (present on admission)  Assessment &  Plan  Body mass index is 36.87 kg/m².   No acute intervention indicated.  Recommend lifestyle interventions and outpatient follow-up.    Acute deep vein thrombosis (DVT) of popliteal vein of left lower extremity (HCC)- (present on admission)  Assessment & Plan  Left lower leg DVT diagnosed on 6/7/2021.  Neurovascularly intact.  Has been taking Eliquis consistently since diagnosis.  Continue therapeutic anticoagulation.  Monitor.    Hypertension- (present on admission)  Assessment & Plan  Mildly hypertensive on presentation.  Holding home antihypertensives pending PE plan.    Type 2 diabetes mellitus with hyperglycemia, without long-term current use of insulin (HCC)- (present on admission)  Assessment & Plan  Hyperglycemic since presentation.  Most recent hemoglobin A1c was 8.8, is outdated.  Does not take insulin at home.  Oral medications only.  Hold oral glycemic medications in the acute setting.  ISS, Accu-Cheks, hypoglycemia protocol.  Check hemoglobin A1c.     Plan  Switch to Lovenox tonight,  Further monitoring and assuring stability  Lower extremity thigh-high [facility]  Low level activity  Monitor telemetry and oxygenation  See orders  Medically complex high risk patient    VTE prophylaxis: Heparin  I have performed a physical exam and reviewed and updated ROS and Plan today . In review of yesterday's note , there are no changes except as documented above.        Please note that this dictation was created using voice recognition software. I have made every reasonable attempt to correct obvious errors, but I expect that there are errors of grammar and possibly context that I did not discover before finalizing the note.

## 2021-06-12 NOTE — ASSESSMENT & PLAN NOTE
Left lower extremity DVT was found on 6/7 and patient has been compliant with her Eliquis and was on appropriate dosing  It appears that she may have had saddle emboli since Monday however it was incidentally caught today when she had CTA done.   I do not believe this is a treatment failure of apixaban for DVT, however monitor over time  Thrombus noted on CT abdomen/pelvis and proximal left lower extremity, will check follow-up NIVT to assess risk for further embolus  No history DVT  No family history of thrombotic diseases  Patient had been using estrogen, likely risk factor-non-smoker  Recent pelvic surgery for hysterectomy for endometriosis, likely risk factor  Okay to resume apixaban

## 2021-06-12 NOTE — PROGRESS NOTES
Critical Care Progress Note    Date of admission  6/11/2021    Chief Complaint  50 y.o. female who presented 6/11/2021 as transfer from [facility] for evaluation of saddle emboli, possible EKOS intervention. Patient states that she had hysterectomy for endometriosis and fibroid (pathology benign) 7 weeks ago. She could not bear weight on her legs and was not very ambulatory during recovery. On Monday, she went to have her left leg evaluated due to swelling. She had doppler done which did show acute DVT and was started on Eliquis 10mg BID. Patient reports compliance to her meds. She states that she had tachycardia on Monday, but no respiratory symptoms. Patient today went back to ED due to nausea, dizziness, and continued tingling sensation to left hand and left foot. She states that the tingling sensation is not new, but the nausea and dizziness is. She had CTA done which showed saddle pulmonary emboli and was transferred to [facility] for higher level of care. In ED, patient had ECHO done which showed RV akinesis. Patient will be admitted to ICU for close monitoring of her hemodynamics at this time. She is currently on heparin drip with normal vitals.  Of note, patient noted to have pancreatitis. She denies any vomiting episodes. Her symptoms may be attributed to this as well which we will give fluids for.   (Dr Garay HPI)     Hospital Course  No notes on file    Interval Problem Update  Reviewed last 24 hour events:    A&O x4  SR 80s  SBp 110-140s  UO good  ECHO reviewed, no strain, mildly enlarged RA & RV with some free wall akinesis and borderline elevated RVSP at 33, EF 70%  CTA + for PE - saddle, films reviewed with patient and her wife at the bedside  CT abdomen/pelvis with no acute abdominal or pelvic process, fatty liver, probable occlusive thrombus left superficial femoral vein  Ultrasound right upper quadrant without any acute abnormalities, no cholelithiasis, some hepatic steatosis  CXR normal  CT head and  MRI brain yesterday normal  Heparin drip, therapeutic  NPO  No pain, N/V  Room air    Discussion with patient and her wife in regards to diagnosis, treatment and plan long-term    Resume apixaban, discontinue heparin  Initiate diet   Ultrasound left lower extremity, follow-up DVT, assess risk for further PE  Transfer to telemetry, monitor for 24 hours  May benefit from outpatient follow-up and reimaging given clot burden    Review of Systems  Review of Systems   Constitutional: Negative for chills, fever and malaise/fatigue.   HENT: Negative for congestion.    Eyes: Negative.    Respiratory: Negative for cough, hemoptysis, sputum production and shortness of breath.    Cardiovascular: Positive for leg swelling (Left lower extremity). Negative for chest pain, palpitations, orthopnea and PND.   Gastrointestinal: Negative for abdominal pain, blood in stool, melena, nausea and vomiting.   Genitourinary: Negative for dysuria and hematuria.   Musculoskeletal: Negative.    Neurological: Negative for focal weakness and headaches.   Endo/Heme/Allergies: Does not bruise/bleed easily.   Psychiatric/Behavioral: The patient is not nervous/anxious.         Vital Signs for last 24 hours   Temp:  [35.9 °C (96.6 °F)-36.5 °C (97.7 °F)] 36.4 °C (97.6 °F)  Pulse:  [] 88  Resp:  [13-28] 16  BP: (110-152)/(61-89) 126/75  SpO2:  [89 %-97 %] 92 %    Hemodynamic parameters for last 24 hours       Respiratory Information for the last 24 hours       Physical Exam   Physical Exam  Vitals reviewed.   Constitutional:       Appearance: She is obese. She is not ill-appearing, toxic-appearing or diaphoretic.   HENT:      Head: Normocephalic and atraumatic.      Mouth/Throat:      Mouth: Mucous membranes are moist.   Eyes:      General: No scleral icterus.     Extraocular Movements: Extraocular movements intact.      Pupils: Pupils are equal, round, and reactive to light.   Cardiovascular:      Rate and Rhythm: Normal rate and regular rhythm.       Pulses: Normal pulses.      Heart sounds: No murmur heard.   No gallop.       Comments: SR  Pulmonary:      Effort: No respiratory distress.      Breath sounds: No wheezing, rhonchi or rales.   Abdominal:      General: Bowel sounds are normal. There is no distension.      Palpations: Abdomen is soft.      Tenderness: There is no abdominal tenderness. There is no right CVA tenderness or left CVA tenderness.   Musculoskeletal:      Cervical back: Neck supple.      Right lower leg: No edema.      Left lower leg: Edema (No cords or calf tenderness) present.   Skin:     General: Skin is warm and dry.      Capillary Refill: Capillary refill takes less than 2 seconds.   Neurological:      General: No focal deficit present.      Mental Status: She is alert and oriented to person, place, and time.   Psychiatric:         Mood and Affect: Mood normal.         Behavior: Behavior normal.         Thought Content: Thought content normal.         Medications  Current Facility-Administered Medications   Medication Dose Route Frequency Provider Last Rate Last Admin   • enoxaparin (LOVENOX) inj 100 mg  100 mg Subcutaneous BID Rick Hutchinson M.D.       • heparin infusion 25,000 units in 500 mL 0.45% NACL  0-30 Units/kg/hr (Adjusted) Intravenous Continuous Rick Hutchinson M.D. 19.8 mL/hr at 06/12/21 1408 12 Units/kg/hr at 06/12/21 1408   • heparin injection 3,300 Units  40 Units/kg (Adjusted) Intravenous PRN Rick Hutchinson M.D.       • insulin lispro (AdmeLOG) injection  1-6 Units Subcutaneous Q6HRS Donna Dias M.D.        And   • glucose 4 g chewable tablet 16 g  16 g Oral Q15 MIN PRN Donna Dias M.D.        And   • dextrose 50% (D50W) injection 50 mL  50 mL Intravenous Q15 MIN PRN Donna Dias M.D.       • senna-docusate (PERICOLACE or SENOKOT S) 8.6-50 MG per tablet 2 tablet  2 tablet Oral BID Donna Dias M.D.        And   • polyethylene glycol/lytes (MIRALAX) PACKET 1 Packet  1 Packet Oral QDAY  PRN Donna Dias M.D.        And   • magnesium hydroxide (MILK OF MAGNESIA) suspension 30 mL  30 mL Oral QDAY PRN Donna Dias M.D.        And   • bisacodyl (DULCOLAX) suppository 10 mg  10 mg Rectal QDAY PRN Donna Dias M.D.       • acetaminophen (Tylenol) tablet 650 mg  650 mg Oral Q6HRS PRN Donna Dias M.D.       • Pharmacy Consult Request ...Pain Management Review 1 Each  1 Each Other PHARMACY TO DOSE Donna Dias M.D.       • oxyCODONE immediate-release (ROXICODONE) tablet 5 mg  5 mg Oral Q3HRS PRN Donna Dias M.D.        Or   • oxyCODONE immediate release (ROXICODONE) tablet 10 mg  10 mg Oral Q3HRS PRFLAQUITA Dias M.D.        Or   • HYDROmorphone (Dilaudid) injection 0.5 mg  0.5 mg Intravenous Q3HRS PRN Donna Dias M.D.           Fluids    Intake/Output Summary (Last 24 hours) at 6/12/2021 1416  Last data filed at 6/12/2021 0600  Gross per 24 hour   Intake 996.36 ml   Output 900 ml   Net 96.36 ml       Laboratory      Recent Labs     06/11/21  1154   TROPONINI <0.02     Recent Labs     06/11/21  1154 06/12/21  0519   SODIUM 139 138   POTASSIUM 3.5 3.8   CHLORIDE 102 105   CO2 24 23   BUN 11 9   CREATININE 1.2* 0.65   MAGNESIUM 1.9  --    CALCIUM 9.2 8.9     Recent Labs     06/11/21  1154 06/12/21  0519   ALTSGPT 54 36   ASTSGOT 36 22   ALKPHOSPHAT 121* 85   TBILIRUBIN 0.6 0.4   LIPASE 1417* 58   GLUCOSE 220* 146*     Recent Labs     06/11/21  1154 06/12/21  0519   WBC 9.0 7.0   NEUTSPOLYS  --  58.60   LYMPHOCYTES  --  28.30   MONOCYTES  --  7.30   EOSINOPHILS  --  5.00   BASOPHILS  --  0.40   ASTSGOT 36 22   ALTSGPT 54 36   ALKPHOSPHAT 121* 85   TBILIRUBIN 0.6 0.4     Recent Labs     06/11/21  1154 06/11/21  1525 06/11/21  2046 06/12/21  0519   RBC 5.40  --   --  4.94   HEMOGLOBIN 15.1  --   --  13.6   HEMATOCRIT 45.8  --   --  42.3   PLATELETCT 271  --   --  246   PROTHROMBTM  --  15.4* 15.6*  --    APTT  --  31.8 55.3*  --    INR  --  1.25* 1.28*  --         Imaging  X-Ray:  I have personally reviewed the images and compared with prior images.  EKG:  I have personally reviewed the images and compared with prior images.  CT:    Reviewed  Echo:   Reviewed  Ultrasound:  Reviewed  MRI:   Reviewed    Assessment/Plan  * Acute saddle pulmonary embolism (HCC)- (present on admission)  Assessment & Plan  On heparin drip per Xa protocol  CTA & ECHO reviewed, Troponin and BNP normal, not tachycardic or hypoxic blood pressure normal as well: Not a candidate for EKOS or systemic TPA at this time  PE occurred early on after diagnosis of PE, I do not consider this a treatment failure  Does have saddle embolus and significant clot burden but well-tolerated, suggest follow-up with pulmonary as an outpatient to consider reimaging for clot resolution as clinically prudent, I reviewed with pulmonary team on transfer out of the ICU    Acute deep vein thrombosis (DVT) of popliteal vein of left lower extremity (HCC)- (present on admission)  Assessment & Plan  Left lower extremity DVT was found on 6/7 and patient has been compliant with her Eliquis and was on appropriate dosing  It appears that she may have had saddle emboli since Monday however it was incidentally caught today when she had CTA done.   I do not believe this is a treatment failure of apixaban for DVT, however monitor over time  Thrombus noted on CT abdomen/pelvis and proximal left lower extremity, will check follow-up NIVT to assess risk for further embolus  No history DVT  No family history of thrombotic diseases  Patient had been using estrogen, likely risk factor-non-smoker  Recent pelvic surgery for hysterectomy for endometriosis, likely risk factor  Okay to resume apixaban    History of endometriosis  Assessment & Plan  Recent hysterectomy, likely risk factor for DVT    Acute pancreatitis- (present on admission)  Assessment & Plan  Mildly elevated lipase has returned to normal returned to normal  Patient remains  asymptomatic  CT abdomen/pelvis and right upper quadrant sonogram only revealing steatosis of liver no other abnormalities  Triglyceride levels 237, 346 - mildly elevated and unlikely cause  Patient is not an excessive alcohol drinker, no obvious stones imaged on either study  Hep-Lock IV fluids  Pain/nausea/vomiting  Peterson diet as tolerated    Hypertension- (present on admission)  Assessment & Plan  Resume home meds clinically appropriate    Class 2 severe obesity due to excess calories with serious comorbidity and body mass index (BMI) of 36.0 to 36.9 in adult ([facility])- (present on admission)  Assessment & Plan  Encouraged behavioral modification weight loss  Monitor LEANDRA  TSH pending?    Elevated serum creatinine- (present on admission)  Assessment & Plan  Normalized overnight    Elevated lipase- (present on admission)  Assessment & Plan  Normalized overnight, asymptomatic from GI standpoint  CT abdomen pelvis unremarkable except for fatty liver    Type 2 diabetes mellitus with hyperglycemia, without long-term current use of insulin ([facility])- (present on admission)  Assessment & Plan  Insulin SS  Hypoglycemia protocols  A1c 8.6  Diabetic education  Weight loss     VTE:  Heparin  Ulcer: Not Indicated  Lines: None    I have performed a physical exam and reviewed and updated ROS and Plan today (6/12/2021). In review of yesterday's note (6/11/2021), there are no changes except as documented above.     Discussed patient condition and risk of morbidity and/or mortality with Family, RN, Pharmacy, Patient and Pulmonary

## 2021-06-12 NOTE — PROGRESS NOTES
Pulmonary Progress Note    Date of admission  6/11/2021    Chief Complaint  50 y.o. female admitted 6/11/2021 with submassive PE.      Hospital Course  6/11: Transferred from [facility] for possible EKOS intervention. On arrival patient was found to have MICHAEL score 2 and remain hemodynamically stable. Echo showed RV wall akinesis c/w RV strain. Started on heparin infusion.     6/12: No acute issues overnight. Remains on RA with no complaints. On heparin infusion. Pulmonary consulted for further management and outpatient follow up.      Interval Problem Update  Reviewed last 24 hour events:  As above     Review of Systems  Review of Systems   Constitutional: Negative for chills and fever.   Respiratory: Negative for cough, hemoptysis, sputum production and shortness of breath.    Cardiovascular: Negative for chest pain, palpitations and orthopnea.   Gastrointestinal: Negative.    Genitourinary: Negative.    All other systems reviewed and are negative.       Vital Signs for last 24 hours   Temp:  [35.9 °C (96.6 °F)-36.6 °C (97.8 °F)] 36.6 °C (97.8 °F)  Pulse:  [] 83  Resp:  [13-28] 17  BP: (110-142)/(61-82) 116/69  SpO2:  [89 %-97 %] 92 %    Hemodynamic parameters for last 24 hours       Respiratory Information for the last 24 hours       Physical Exam   Physical Exam  Constitutional:       General: She is not in acute distress.     Appearance: Normal appearance. She is not ill-appearing.   HENT:      Head: Normocephalic.      Nose: Nose normal.   Eyes:      Pupils: Pupils are equal, round, and reactive to light.   Cardiovascular:      Rate and Rhythm: Normal rate and regular rhythm.      Pulses: Normal pulses.   Pulmonary:      Effort: Pulmonary effort is normal. No respiratory distress.      Breath sounds: Normal breath sounds. No stridor.   Abdominal:      General: Abdomen is flat. Bowel sounds are normal. There is no distension.      Palpations: Abdomen is soft.   Musculoskeletal:         General: Normal  range of motion.      Cervical back: Normal range of motion.      Right lower leg: No edema.      Left lower leg: No edema.   Skin:     General: Skin is warm.      Capillary Refill: Capillary refill takes more than 3 seconds.   Neurological:      Mental Status: She is alert and oriented to person, place, and time.      Cranial Nerves: No cranial nerve deficit.   Psychiatric:         Mood and Affect: Mood normal.         Medications  Current Facility-Administered Medications   Medication Dose Route Frequency Provider Last Rate Last Admin   • enoxaparin (LOVENOX) inj 100 mg  100 mg Subcutaneous BID Rick Hutchinson M.D.       • heparin infusion 25,000 units in 500 mL 0.45% NACL  0-30 Units/kg/hr (Adjusted) Intravenous Continuous Rick Hutchinson M.D. 19.8 mL/hr at 06/12/21 1415 12 Units/kg/hr at 06/12/21 1415   • heparin injection 3,300 Units  40 Units/kg (Adjusted) Intravenous PRN Rick Hutchinson M.D.       • insulin lispro (AdmeLOG) injection  1-6 Units Subcutaneous Q6HRS Donna Dias M.D.        And   • glucose 4 g chewable tablet 16 g  16 g Oral Q15 MIN PRN Donna Dias M.D.        And   • dextrose 50% (D50W) injection 50 mL  50 mL Intravenous Q15 MIN PRN Donna Dias M.D.       • senna-docusate (PERICOLACE or SENOKOT S) 8.6-50 MG per tablet 2 tablet  2 tablet Oral BID Donna Dias M.D.        And   • polyethylene glycol/lytes (MIRALAX) PACKET 1 Packet  1 Packet Oral QDAY PRN Donna Dias M.D.        And   • magnesium hydroxide (MILK OF MAGNESIA) suspension 30 mL  30 mL Oral QDAY PRN Donna Dias M.D.        And   • bisacodyl (DULCOLAX) suppository 10 mg  10 mg Rectal QDAY PRN Donna Dias M.D.       • acetaminophen (Tylenol) tablet 650 mg  650 mg Oral Q6HRS PRN Donna Dias M.D.       • Pharmacy Consult Request ...Pain Management Review 1 Each  1 Each Other PHARMACY TO DOSE Donna Dias M.D.       • oxyCODONE immediate-release (ROXICODONE) tablet 5 mg  5 mg Oral Q3HRS  PRFLAQUITA Dias M.D.        Or   • oxyCODONE immediate release (ROXICODONE) tablet 10 mg  10 mg Oral Q3HRS ADAM Dias M.D.        Or   • HYDROmorphone (Dilaudid) injection 0.5 mg  0.5 mg Intravenous Q3HRS PRFLAQUITA Dias M.D.           Fluids    Intake/Output Summary (Last 24 hours) at 6/12/2021 1533  Last data filed at 6/12/2021 0600  Gross per 24 hour   Intake 996.36 ml   Output 900 ml   Net 96.36 ml       Laboratory      Recent Labs     06/11/21  1154   TROPONINI <0.02     Recent Labs     06/11/21  1154 06/12/21  0519   SODIUM 139 138   POTASSIUM 3.5 3.8   CHLORIDE 102 105   CO2 24 23   BUN 11 9   CREATININE 1.2* 0.65   MAGNESIUM 1.9  --    CALCIUM 9.2 8.9     Recent Labs     06/11/21  1154 06/12/21  0519   ALTSGPT 54 36   ASTSGOT 36 22   ALKPHOSPHAT 121* 85   TBILIRUBIN 0.6 0.4   LIPASE 1417* 58   GLUCOSE 220* 146*     Recent Labs     06/11/21  1154 06/12/21  0519   WBC 9.0 7.0   NEUTSPOLYS  --  58.60   LYMPHOCYTES  --  28.30   MONOCYTES  --  7.30   EOSINOPHILS  --  5.00   BASOPHILS  --  0.40   ASTSGOT 36 22   ALTSGPT 54 36   ALKPHOSPHAT 121* 85   TBILIRUBIN 0.6 0.4     Recent Labs     06/11/21  1154 06/11/21  1525 06/11/21 2046 06/12/21  0519   RBC 5.40  --   --  4.94   HEMOGLOBIN 15.1  --   --  13.6   HEMATOCRIT 45.8  --   --  42.3   PLATELETCT 271  --   --  246   PROTHROMBTM  --  15.4* 15.6*  --    APTT  --  31.8 55.3*  --    INR  --  1.25* 1.28*  --        Imaging  Echo:   No prior study is available for comparison.   Normal left ventricular systolic function.  Mildly enlarged RV with free wall akinesis - consider pulmonary   embolism      Assessment/Plan  * Acute saddle pulmonary embolism (HCC)- (present on admission)  Assessment & Plan  -- Provked PE given recent surgery    -- Echo -> RV strain given RV wall akinesis   -- No hemodynamic instability noted since admission   -- Calculated PESI score -> class I (very low risk -> 0-1.6% 30 day mortality)   -- Calculated MICHAEL score -> stage  I (low risk)   -- Based on ESC 2019 guidelines, patient is considered intermediate risk. Given low MICHAEL score and no hemodynamic instability, systemic AC should be sufficient and will recommend against systemic tPA or CDT.   -- Recommend cont heparin infusion for another 24 hours and if no hemodynamic instability then recommend transitioning to NOAC   -- Recommend minimum of 3 months systemic AC   -- Will schedule 2 months hospital follow up          VTE:  Heparin  Ulcer: Not Indicated  Lines: None    I have performed a physical exam and reviewed and updated ROS and Plan today (6/12/2021). In review of yesterday's note (6/11/2021), there are no changes except as documented above.     Discussed patient condition and risk of morbidity and/or mortality with Hospitalist, Family, RN and Patient

## 2021-06-12 NOTE — PROGRESS NOTES
Received patient from [facility], alert and oriented. Patient on heparin drip. Family at bedside. Patient given orientation to the floor. Call light within reach.

## 2021-06-12 NOTE — ASSESSMENT & PLAN NOTE
Became symptomatic , prompting presentation.  Evidence of right heart strain on imaging.  Echocardiogram pending.  So far did well, no oxygen needs, continue anticoagulation,

## 2021-06-12 NOTE — PROGRESS NOTES
4 Eyes Skin Assessment Completed by Jane ALLEN and Ilene ALLEN.     Head WDL  Ears WDL  Nose WDL  Mouth WDL  Neck WDL  Breast/Chest WDL  Shoulder Blades WDL  Spine WDL  (R) Arm/Elbow/Hand WDL  (L) Arm/Elbow/Hand WDL  Abdomen WDL  Groin WDL  Scrotum/Coccyx/Buttocks WDL  (R) Leg WDL  (L) Leg WDL  (R) Heel/Foot/Toe WDL  (L) Heel/Foot/Toe WDL          Devices In Places Tele Box      Interventions In Place Pillows and Pressure Redistribution Mattress    Possible Skin Injury No    Pictures Uploaded Into Epic N/A  Wound Consult Placed N/A  RN Wound Prevention Protocol Ordered No      No open wounds noted.

## 2021-06-12 NOTE — ASSESSMENT & PLAN NOTE
Mildly elevated lipase has returned to normal returned to normal  Patient remains asymptomatic  CT abdomen/pelvis and right upper quadrant sonogram only revealing steatosis of liver no other abnormalities  Triglyceride levels 237, 346 - mildly elevated and unlikely cause  Patient is not an excessive alcohol drinker, no obvious stones imaged on either study  Hep-Lock IV fluids  Pain/nausea/vomiting  Peterson diet as tolerated

## 2021-06-12 NOTE — PROGRESS NOTES
4 Eyes Skin Assessment Completed by Kacy MUNOZ, RN and Akua SCHMITZ RN.    Head WDL  Ears WDL  Nose WDL  Mouth WDL  Neck WDL  Breast/Chest WDL  Shoulder Blades WDL  Spine WDL  (R) Arm/Elbow/Hand WDL  (L) Arm/Elbow/Hand WDL  Abdomen WDL  Groin WDL  Scrotum/Coccyx/Buttocks WDL  (R) Leg WDL  (L) Leg WDL  (R) Heel/Foot/Toe WDL  (L) Heel/Foot/Toe WDL          Devices In Places ECG, Tele Box, Blood Pressure Cuff and Pulse Ox      Interventions In Place Pillows, Heels Loaded W/Pillows and Pressure Redistribution Mattress    Possible Skin Injury No    Pictures Uploaded Into Epic N/A  Wound Consult Placed N/A  RN Wound Prevention Protocol Ordered No

## 2021-06-12 NOTE — ASSESSMENT & PLAN NOTE
Normalized overnight, asymptomatic from GI standpoint  CT abdomen pelvis unremarkable except for fatty liver

## 2021-06-12 NOTE — ASSESSMENT & PLAN NOTE
On heparin drip per Xa protocol  CTA & ECHO reviewed, Troponin and BNP normal, not tachycardic or hypoxic blood pressure normal as well: Not a candidate for EKOS or systemic TPA at this time  PE occurred early on after diagnosis of PE, I do not consider this a treatment failure  Does have saddle embolus and significant clot burden but well-tolerated, suggest follow-up with pulmonary as an outpatient to consider reimaging for clot resolution as clinically prudent, I reviewed with pulmonary team on transfer out of the ICU

## 2021-06-12 NOTE — CARE PLAN
Problem: Knowledge Deficit - Standard  Goal: Patient and family/care givers will demonstrate understanding of plan of care, disease process/condition, diagnostic tests and medications  Outcome: Progressing     Problem: Respiratory  Goal: Patient will achieve/maintain optimum respiratory ventilation and gas exchange  Outcome: Progressing  Note: Patient reports no CP, SOB. No dyspnea, and is on room air.    The patient is Stable - Low risk of patient condition declining or worsening         Progress made toward(s) clinical / shift goals: Maintained Respiratory status

## 2021-06-12 NOTE — CONSULTS
Critical Care Consultation    Date of consult: 6/11/2021    Referring Physician  Donna Dias M.D.    Reason for Consultation  Saddle emboli    History of Presenting Illness  50 y.o. female who presented 6/11/2021 as transfer from [facility] for evaluation of saddle emboli, possible EKOS intervention. Patient states that she had hysterectomy for endometriosis and fibroid (pathology benign) 7 weeks ago. She could not bear weight on her legs and was not very ambulatory during recovery. On Monday, she went to have her left leg evaluated due to swelling. She had doppler done which did show acute DVT and was started on Eliquis 10mg BID. Patient reports compliance to her meds. She states that she had tachycardia on Monday, but no respiratory symptoms. Patient today went back to ED due to nausea, dizziness, and continued tingling sensation to left hand and left foot. She states that the tingling sensation is not new, but the nausea and dizziness is. She had CTA done which showed saddle pulmonary emboli and was transferred to [facility] for higher level of care. In ED, patient had ECHO done which showed RV akinesis. Patient will be admitted to ICU for close monitoring of her hemodynamics at this time. She is currently on heparin drip with normal vitals.    Of note, patient noted to have pancreatitis. She denies any vomiting episodes. Her symptoms may be attributed to this as well which we will give fluids for.     Code Status  Full Code    Review of Systems  Review of Systems   Constitutional: Negative.    HENT: Negative.    Eyes: Negative.    Respiratory: Negative.    Cardiovascular: Positive for leg swelling.   Gastrointestinal: Negative.    Genitourinary: Negative.    Musculoskeletal: Negative.    Skin: Negative.    Neurological: Positive for dizziness and tingling.   Psychiatric/Behavioral: Negative.        Past Medical History   has a past medical history of Diabetes ([facility]), DVT (deep venous thrombosis) ([facility])  (06/2021), Endometriosis, and Hypertension.    Surgical History   has a past surgical history that includes gyn surgery (04/2021); other orthopedic surgery; and other orthopedic surgery.    Family History  family history includes Cancer in her maternal grandmother; Diabetes in her brother, father, and maternal grandfather; Heart Disease in her paternal grandfather.    Social History   reports that she has never smoked. She has never used smokeless tobacco. She reports current alcohol use. She reports previous drug use.    Medications  Home Medications     Reviewed by Andrew Roberts (Pharmacy [facility]) on 06/11/21 at 1549  Med List Status: Complete   Medication Last Dose Status   amLODIPine (NORVASC) 2.5 MG Tab 6/10/2021 Active   apixaban (ELIQUIS) 5mg Tab 6/11/2021 Active   Empagliflozin-metFORMIN HCl 12.5-500 MG Tab 6/11/2021 Active   fenofibrate (TRICOR) 145 MG Tab 6/10/2021 Active   ibuprofen (MOTRIN IB) 200 MG Tab 6/11/2021 Active   irbesartan (AVAPRO) 300 MG Tab 6/10/2021 Active   SITagliptin (JANUVIA) 50 MG Tab 6/11/2021 Active              Current Facility-Administered Medications   Medication Dose Route Frequency Provider Last Rate Last Admin   • heparin infusion 25,000 units in 500 mL 0.45% NACL  0-30 Units/kg/hr (Adjusted) Intravenous Continuous Boston Spicer M.D. 18 mL/hr at 06/11/21 1542 10.936 Units/kg/hr at 06/11/21 1542   • heparin injection 3,300 Units  40 Units/kg (Adjusted) Intravenous PRN Boston Spicer M.D.       • insulin lispro (AdmeLOG) injection  1-6 Units Subcutaneous Q6HRS Donna Dias M.D.        And   • glucose 4 g chewable tablet 16 g  16 g Oral Q15 MIN PRN Donna Dias M.D.        And   • dextrose 50% (D50W) injection 50 mL  50 mL Intravenous Q15 MIN PRN Donna Dias M.D.       • senna-docusate (PERICOLACE or SENOKOT S) 8.6-50 MG per tablet 2 tablet  2 tablet Oral BID Donna Dias M.D.        And   • polyethylene glycol/lytes (MIRALAX) PACKET 1 Packet  1  Packet Oral QDAY PRN Donna Dias M.D.        And   • magnesium hydroxide (MILK OF MAGNESIA) suspension 30 mL  30 mL Oral QDAY PRN Donna Dias M.D.        And   • bisacodyl (DULCOLAX) suppository 10 mg  10 mg Rectal QDAY PRN Donna Dias M.D.       • acetaminophen (Tylenol) tablet 650 mg  650 mg Oral Q6HRS PRN Donna Dias M.D.       • Pharmacy Consult Request ...Pain Management Review 1 Each  1 Each Other PHARMACY TO DOSE Donna Dias M.D.       • oxyCODONE immediate-release (ROXICODONE) tablet 5 mg  5 mg Oral Q3HRS PRN Donna Dias M.D.        Or   • oxyCODONE immediate release (ROXICODONE) tablet 10 mg  10 mg Oral Q3HRS PRN Donna Dias M.D.        Or   • HYDROmorphone (Dilaudid) injection 0.5 mg  0.5 mg Intravenous Q3HRS PRN Donna Dias M.D.         Current Outpatient Medications   Medication Sig Dispense Refill   • apixaban (ELIQUIS) 5mg Tab Take 10 mg by mouth 2 times a day. 10mg = 2 tabs     • Empagliflozin-metFORMIN HCl 12.5-500 MG Tab Take 1 Tablet 24 Hour Sustained Release by mouth every day.     • irbesartan (AVAPRO) 300 MG Tab Take 300 mg by mouth at bedtime.     • fenofibrate (TRICOR) 145 MG Tab Take 1 tablet by mouth at bedtime.     • amLODIPine (NORVASC) 2.5 MG Tab Take 2.5 mg by mouth at bedtime.     • SITagliptin (JANUVIA) 50 MG Tab Take 50 mg by mouth every day.     • ibuprofen (MOTRIN IB) 200 MG Tab Take 600 mg by mouth one time as needed for Moderate Pain.         Allergies  No Known Allergies    Vital Signs last 24 hours  Temp:  [36.3 °C (97.3 °F)] 36.3 °C (97.3 °F)  Pulse:  [] 108  Resp:  [16-20] 16  BP: (130-152)/(69-90) 152/89  SpO2:  [91 %-95 %] 94 %    Physical Exam  Physical Exam  Vitals and nursing note reviewed.   Constitutional:       General: She is not in acute distress.     Appearance: Normal appearance. She is obese. She is not ill-appearing.   HENT:      Head: Normocephalic and atraumatic.      Mouth/Throat:      Mouth: Mucous membranes are  moist.      Pharynx: Oropharynx is clear.   Eyes:      General: No scleral icterus.     Extraocular Movements: Extraocular movements intact.      Conjunctiva/sclera: Conjunctivae normal.      Pupils: Pupils are equal, round, and reactive to light.   Cardiovascular:      Rate and Rhythm: Regular rhythm. Tachycardia present.      Pulses: Normal pulses.      Heart sounds: Normal heart sounds. No murmur heard.     Pulmonary:      Effort: Pulmonary effort is normal.      Breath sounds: Normal breath sounds. No rhonchi.   Abdominal:      General: Abdomen is flat. Bowel sounds are normal. There is no distension.      Palpations: Abdomen is soft.      Tenderness: There is no abdominal tenderness. There is no guarding.   Musculoskeletal:         General: No tenderness. Normal range of motion.      Cervical back: Normal range of motion and neck supple. No rigidity or tenderness.      Left lower leg: Edema present.   Skin:     General: Skin is warm and dry.      Coloration: Skin is not jaundiced or pale.   Neurological:      General: No focal deficit present.      Mental Status: She is alert and oriented to person, place, and time. Mental status is at baseline.      Cranial Nerves: No cranial nerve deficit.      Motor: No weakness.   Psychiatric:         Mood and Affect: Mood normal.         Behavior: Behavior normal.         Thought Content: Thought content normal.         Judgment: Judgment normal.         Fluids  No intake or output data in the 24 hours ending 06/11/21 7509    Laboratory  Recent Results (from the past 48 hour(s))   CBC WITH DIFFERENTIAL    Collection Time: 06/11/21 11:54 AM   Result Value Ref Range    WBC 9.0 4.8 - 10.8 K/uL    RBC 5.40 4.20 - 5.40 M/uL    Hemoglobin 15.1 13.0 - 17.0 g/dL    Hematocrit 45.8 39.0 - 50.0 %    MCV 84.8 81.0 - 99.0 fL    MCH 28.0 27.0 - 31.0 pg    MCHC 33.0 33.0 - 37.0 g/dL    RDW 12.7 11.5 - 14.5 %    Platelet Count 271 130 - 400 K/uL    MPV 9.5 7.4 - 10.4 fL    Neutrophils  Automated 62.2 39.0 - 70.0 %    Lymphocytes Automated 25.6 21.0 - 50.0 %    Monocytes Automated 7.7 2.0 - 9.0 %    Eosinophils Automated 3.6 0.0 - 5.0 %    Basophils Automated 0.6 0.0 - 3.0 %    Abs Neutrophils Automated 5.6 1.8 - 7.7 K/uL    Abs Lymph Automated 2.3 1.2 - 4.8 K/uL    Eosinophil Count, Blood 0.32 0.00 - 0.50 K/uL   COMP METABOLIC PANEL    Collection Time: 06/11/21 11:54 AM   Result Value Ref Range    Sodium 139 136 - 145 mmol/L    Potassium 3.5 3.5 - 5.1 mmol/L    Chloride 102 98 - 107 mmol/L    Co2 24 21 - 32 mmol/L    Anion Gap 17 10 - 18 mmol/L    Glucose 220 (H) 74 - 99 mg/dL    Bun 11 7 - 18 mg/dL    Creatinine 1.2 (H) 0.6 - 1.0 mg/dL    Calcium 9.2 8.5 - 11.0 mg/dL    AST(SGOT) 36 15 - 37 U/L    ALT(SGPT) 54 12 - 78 U/L    Alkaline Phosphatase 121 (H) 46 - 116 U/L    Total Bilirubin 0.6 0.2 - 1.0 mg/dL    Albumin 4.2 3.4 - 5.0 g/dL    Total Protein 8.5 (H) 6.4 - 8.2 g/dL    A-G Ratio 1.0    TROPONIN    Collection Time: 06/11/21 11:54 AM   Result Value Ref Range    Troponin I <0.02 0.00 - 0.06 ng/mL   LIPASE    Collection Time: 06/11/21 11:54 AM   Result Value Ref Range    Lipase 1417 (H) 73 - 393 U/L   ESTIMATED GFR    Collection Time: 06/11/21 11:54 AM   Result Value Ref Range    GFR If  58 (A) >60 mL/min/1.73 m 2    GFR If Non  48 (A) >60 mL/min/1.73 m 2   MAGNESIUM    Collection Time: 06/11/21 11:54 AM   Result Value Ref Range    Magnesium 1.9 1.8 - 2.4 mg/dL   COVID/SARS CoV-2 PCR    Collection Time: 06/11/21  1:45 PM    Specimen: Nasopharyngeal; Respirate   Result Value Ref Range    COVID Order Status Received    SARS-COV Antigen COREY    Collection Time: 06/11/21  1:45 PM   Result Value Ref Range    SARS-CoV-2 Source NP Swab     SARS-COV ANTIGEN COREY NotDetected Not-Detected   HEMOGLOBIN A1C    Collection Time: 06/11/21  3:10 PM   Result Value Ref Range    Glycohemoglobin 8.6 (H) 4.0 - 5.6 %    Est Avg Glucose 200 mg/dL   proBrain Natriuretic Peptide, NT     Collection Time: 06/11/21  3:10 PM   Result Value Ref Range    NT-proBNP 17 0 - 125 pg/mL   aPTT    Collection Time: 06/11/21  3:25 PM   Result Value Ref Range    APTT 31.8 24.7 - 36.0 sec   Prothrombin Time    Collection Time: 06/11/21  3:25 PM   Result Value Ref Range    PT 15.4 (H) 12.0 - 14.6 sec    INR 1.25 (H) 0.87 - 1.13   Heparin Xa (Unfractionated)    Collection Time: 06/11/21  3:25 PM   Result Value Ref Range    Heparin Xa (UFH) >1.10 (HH) IU/mL   EC-ECHOCARDIOGRAM COMPLETE W/ CONT    Collection Time: 06/11/21  4:14 PM   Result Value Ref Range    Eject.Frac. MOD BP 77.64     Eject.Frac. MOD 4C 71.2     Eject.Frac. MOD 2C 82.75     Left Ventrical Ejection Fraction 70        Imaging  EC-ECHOCARDIOGRAM COMPLETE W/ CONT   Final Result      OUTSIDE IMAGES-MR BRAIN   Final Result      OUTSIDE IMAGES-CT CHEST   Final Result      OUTSIDE IMAGES-CT ABDOMEN /PELVIS   Final Result      OUTSIDE IMAGES-CT HEAD   Final Result      OUTSIDE IMAGES-DX CHEST   Final Result          Assessment/Plan  * Acute saddle pulmonary embolism (HCC)- (present on admission)  Assessment & Plan  -On heparin drip per Xa protocol  -ECHO reviewed, trop and bnp normal, not a candidate for EKOS at this time  -Will monitor response on heparin drip, may be able to return back to [facility] if her hemodynamics is stable in AM    Acute pancreatitis- (present on admission)  Assessment & Plan  -Her clinical and labs are more consistent with acute pancreatitis  -Will obtain RUQ sono, CT abdomen was reviewed by me and does not appear to be remarkable  -Will check triglyceride level  -Patient is not an excessive alcohol drinker  -LR at 125cc/hr  -PRN pain control  -NPO for tonight. Will ADAT tomorrow.     Class 2 severe obesity due to excess calories with serious comorbidity and body mass index (BMI) of 36.0 to 36.9 in adult (HCC)- (present on admission)  Assessment & Plan  -Advised diet and exercise    Acute deep vein thrombosis (DVT) of popliteal vein  of left lower extremity (HCC)- (present on admission)  Assessment & Plan  -DVT was found on Monday and patient has been compliant with her Eliquis and is on appropriate dosing  -It appears that she may have had saddle emboli since Monday however it was incidentally caught today when she had CTA done. I do not believe that her symptoms are consistent with the saddle emboli, she is not desaturating and does not exhibit any respiratory or cardiac symptoms. Incidentally, she was found to have pancreatitis and her N/V may be attributed to this instead.   -Will monitor her response with treatment for pancreatitis. She may not warrant change in her anticoagulation if this is the case.     Hypertension- (present on admission)  Assessment & Plan  -Resume home meds    Type 2 diabetes mellitus with hyperglycemia, without long-term current use of insulin (HCC)- (present on admission)  Assessment & Plan  -Insulin SS      Discussed patient condition and risk of morbidity and/or mortality with Hospitalist, Family, RN and Patient.    The patient remains critically ill.  Critical care time = 35 minutes in directly providing and coordinating critical care and extensive data review.  No time overlap and excludes procedures.

## 2021-06-12 NOTE — CARE PLAN
Problem: Knowledge Deficit - Standard  Goal: Patient and family/care givers will demonstrate understanding of plan of care, disease process/condition, diagnostic tests and medications  Outcome: Progressing     Problem: Respiratory  Goal: Patient will achieve/maintain optimum respiratory ventilation and gas exchange  Outcome: Progressing   The patient is Stable - Low risk of patient condition declining or worsening

## 2021-06-12 NOTE — ASSESSMENT & PLAN NOTE
-- Provked PE given recent surgery    -- Echo -> RV strain given RV wall akinesis   -- No hemodynamic instability noted since admission   -- Calculated PESI score -> class I (very low risk -> 0-1.6% 30 day mortality)   -- Calculated MICHAEL score -> stage I (low risk)   -- Based on ESC 2019 guidelines, patient is considered intermediate risk. Given low MICHAEL score and no hemodynamic instability, systemic AC should be sufficient and will recommend against systemic tPA or CDT.   -- Recommend cont heparin infusion for another 24 hours and if no hemodynamic instability then recommend transitioning to NOAC   -- Recommend minimum of 3 months systemic AC   -- Will schedule 2 months hospital follow up

## 2021-06-13 ENCOUNTER — PATIENT OUTREACH (OUTPATIENT)
Dept: HEALTH INFORMATION MANAGEMENT | Facility: OTHER | Age: 51
End: 2021-06-13

## 2021-06-13 VITALS
OXYGEN SATURATION: 95 % | HEIGHT: 68 IN | TEMPERATURE: 97.4 F | RESPIRATION RATE: 16 BRPM | DIASTOLIC BLOOD PRESSURE: 82 MMHG | WEIGHT: 239.42 LBS | SYSTOLIC BLOOD PRESSURE: 127 MMHG | HEART RATE: 66 BPM | BODY MASS INDEX: 36.29 KG/M2

## 2021-06-13 LAB
GLUCOSE BLD-MCNC: 193 MG/DL (ref 65–99)
GLUCOSE BLD-MCNC: 194 MG/DL (ref 65–99)

## 2021-06-13 PROCEDURE — 306311 ANTI-EMBOLISM STOCKINGS XXLRG LNG: Performed by: STUDENT IN AN ORGANIZED HEALTH CARE EDUCATION/TRAINING PROGRAM

## 2021-06-13 PROCEDURE — 99239 HOSP IP/OBS DSCHRG MGMT >30: CPT | Performed by: STUDENT IN AN ORGANIZED HEALTH CARE EDUCATION/TRAINING PROGRAM

## 2021-06-13 PROCEDURE — A9270 NON-COVERED ITEM OR SERVICE: HCPCS | Performed by: INTERNAL MEDICINE

## 2021-06-13 PROCEDURE — 700111 HCHG RX REV CODE 636 W/ 250 OVERRIDE (IP): Performed by: HOSPITALIST

## 2021-06-13 PROCEDURE — 700102 HCHG RX REV CODE 250 W/ 637 OVERRIDE(OP): Performed by: INTERNAL MEDICINE

## 2021-06-13 PROCEDURE — 82962 GLUCOSE BLOOD TEST: CPT | Mod: 91

## 2021-06-13 RX ADMIN — INSULIN LISPRO 1 UNITS: 100 INJECTION, SOLUTION INTRAVENOUS; SUBCUTANEOUS at 08:08

## 2021-06-13 RX ADMIN — INSULIN LISPRO 1 UNITS: 100 INJECTION, SOLUTION INTRAVENOUS; SUBCUTANEOUS at 11:37

## 2021-06-13 RX ADMIN — ACETAMINOPHEN 650 MG: 325 TABLET, FILM COATED ORAL at 09:28

## 2021-06-13 RX ADMIN — ENOXAPARIN SODIUM 100 MG: 100 INJECTION SUBCUTANEOUS at 05:19

## 2021-06-13 ASSESSMENT — PAIN DESCRIPTION - PAIN TYPE: TYPE: ACUTE PAIN

## 2021-06-13 NOTE — PROGRESS NOTES
Patient discharged home. Discharge instructions given to the patient, verbalized understanding. Chidi stated she has an appointment with PCP tomorrow. Patient stated patient took 4 doses of 10mg eliquis BID prior to admission, Dr. Cameron was notified, meds adjusted. Patient was explained about the eliquis administration, verbalized understanding. All belongings sent with the patient. Significant other at bedside, will provide transportation. Patient was escorted downstairs.

## 2021-06-13 NOTE — CARE PLAN
The patient is Stable - Low risk of patient condition declining or worsening         Progress made toward(s) clinical / shift goals:        Problem: Knowledge Deficit - Standard  Goal: Patient and family/care givers will demonstrate understanding of plan of care, disease process/condition, diagnostic tests and medications  6/12/2021 2148 by Humberto Moscoso R.N.  Outcome: Progressing  Note: Pt educated regarding plan of care and medications. All questions answered.    6/12/2021 2117 by Humberto Moscoso R.N.  Outcome: Progressing  Note: Pt educated regarding plan of care and medications. All questions answered.         Patient is not progressing towards the following goals:

## 2021-06-13 NOTE — DISCHARGE SUMMARY
Discharge Summary    CHIEF COMPLAINT ON ADMISSION  Chief Complaint   Patient presents with   • Pulmonary Embolism     BIB EMS from [facility] for a saddle PE. c/o SOB, tingling in left hand and right foot.        Reason for Admission  EMS     Admission Date  6/11/2021    CODE STATUS  Full Code    HPI & HOSPITAL COURSE  This is a 50 y.o. female here with lightheadedness. Patient with history endometriosis and hysterectomy April 2021, recently diagnosed with left lower leg DVT 5 days prior to presentation, on eliquis, who presented to outside hospital for lightheadedness. She underwent CTA of chest revealing saddle pulmonary embolism, and was transferred to [facility] for higher level of care. Here, echocardiogram showed right heart strain, patient started on heparin drip and monitored in ICU. Evaluated by pulmonology, patient hemodynamically stable, appropriate for systemic anticoagulation, did not need tPA or catheter directed thrombolysis. Patient transitioned to eliquis on discharge. Eliquis use for five days prior to presentation not considered a treatment failure as pulmonary embolism likely occurred early on after diagnosis of DVT. Patient to continue on eliquis and follow up with pulmonology.      Therefore, she is discharged in fair and stable condition to home with close outpatient follow-up.    The patient met 2-midnight criteria for an inpatient stay at the time of discharge.    Discharge Date  6/13/21    FOLLOW UP ITEMS POST DISCHARGE  Take medications as prescribed.  Follow up with pulmonology and PCP.    DISCHARGE DIAGNOSES  Principal Problem:    Acute saddle pulmonary embolism (HCC) POA: Yes  Active Problems:    Type 2 diabetes mellitus with hyperglycemia, without long-term current use of insulin (HCC) POA: Yes    Hypertension POA: Yes    Acute deep vein thrombosis (DVT) of popliteal vein of left lower extremity (HCC) POA: Yes      Overview: left leg    Class 2 severe obesity due to excess calories with serious  comorbidity and body mass index (BMI) of 36.0 to 36.9 in adult (HCC) POA: Yes    Elevated lipase POA: Yes    Elevated serum creatinine POA: Yes    Acute pancreatitis POA: Yes    History of endometriosis POA: Unknown  Resolved Problems:    Endometriosis POA: Yes    Nausea POA: Yes      FOLLOW UP  No future appointments.  МАРИНА Palencia  [address]  [place] [address]  [phone]    In 1 week  Please call to schedule a hospital follow up with your primary provider. Thank you    Darius Morgan M.D.  [address]  [address]  [place] [id number] [phone]    In 2 months        MEDICATIONS ON DISCHARGE     Medication List      CHANGE how you take these medications      Instructions   apixaban 5mg Tabs  Start taking on: June 13, 2021  What changed: See the new instructions.  Commonly known as: Eliquis   Take 2 Tablets by mouth 2 times a day for 3 days, THEN 1 tablet 2 times a day for 30 days. 10mg = 2 tabs        CONTINUE taking these medications      Instructions   amLODIPine 2.5 MG Tabs  Commonly known as: NORVASC   Take 2.5 mg by mouth at bedtime.  Dose: 2.5 mg     Empagliflozin-metFORMIN HCl 12.5-500 MG Tabs   Take 1 Tablet 24 Hour Sustained Release by mouth every day.  Dose: 1 Tablet 24 Hour Sustained Release     fenofibrate 145 MG Tabs  Commonly known as: TRICOR   Take 1 tablet by mouth at bedtime.  Dose: 1 tablet     irbesartan 300 MG Tabs  Commonly known as: AVAPRO   Take 300 mg by mouth at bedtime.  Dose: 300 mg     Motrin  MG Tabs  Generic drug: ibuprofen   Take 600 mg by mouth one time as needed for Moderate Pain.  Dose: 600 mg     SITagliptin 50 MG Tabs  Commonly known as: JANUVIA   Take 50 mg by mouth every day.  Dose: 50 mg            Allergies  No Known Allergies    DIET  Orders Placed This Encounter   Procedures   • Diet Order Diet: Consistent CHO (Diabetic)     Standing Status:   Standing     Number of Occurrences:   1     Order Specific Question:   Diet:     Answer:   Consistent  CHO (Diabetic) [4]       ACTIVITY  As tolerated.  Weight bearing as tolerated    CONSULTATIONS  Pulmonology  ICU/pulmonary    PROCEDURES  none    LABORATORY  Lab Results   Component Value Date    SODIUM 138 06/12/2021    POTASSIUM 3.8 06/12/2021    CHLORIDE 105 06/12/2021    CO2 23 06/12/2021    GLUCOSE 146 (H) 06/12/2021    BUN 9 06/12/2021    CREATININE 0.65 06/12/2021        Lab Results   Component Value Date    WBC 7.0 06/12/2021    HEMOGLOBIN 13.6 06/12/2021    HEMATOCRIT 42.3 06/12/2021    PLATELETCT 246 06/12/2021        Total time of the discharge process exceeds 37 minutes.

## 2021-06-13 NOTE — CARE PLAN
The patient is Stable - Low risk of patient condition declining or worsening         Progress made toward(s) clinical / shift goals:       Problem: Knowledge Deficit - Standard  Goal: Patient and family/care givers will demonstrate understanding of plan of care, disease process/condition, diagnostic tests and medications  Outcome: Progressing  Note: Pt educated regarding plan of care and medications. All questions answered.         Patient is not progressing towards the following goals:

## 2021-06-13 NOTE — CARE PLAN
Problem: Knowledge Deficit - Standard  Goal: Patient and family/care givers will demonstrate understanding of plan of care, disease process/condition, diagnostic tests and medications  Outcome: Progressing     Problem: Respiratory  Goal: Patient will achieve/maintain optimum respiratory ventilation and gas exchange  Outcome: Progressing     Problem: Pain - Standard  Goal: Alleviation of pain or a reduction in pain to the patient’s comfort goal  Outcome: Progressing   The patient is Stable - Low risk of patient condition declining or worsening

## 2021-06-13 NOTE — DISCHARGE INSTRUCTIONS
Discharge Instructions    Discharged to home by car with relative. Discharged via wheelchair, hospital escort: Yes.  Special equipment needed: Not Applicable    Be sure to schedule a follow-up appointment with your primary care doctor or any specialists as instructed.     Discharge Plan:   Diet Plan: Discussed  Activity Level: Discussed  Confirmed Follow up Appointment: Patient to Call and Schedule Appointment  Confirmed Symptoms Management: Discussed  Medication Reconciliation Updated: Yes    I understand that a diet low in cholesterol, fat, and sodium is recommended for good health. Unless I have been given specific instructions below for another diet, I accept this instruction as my diet prescription.   Other diet: Diabetic diet    Special Instructions:   Deep Vein Thrombosis Discharge Instructions    A deep vein thrombosis (DVT) is a blood clot (thrombus) that develops in a deep vein. A DVT is a clot in the deep, larger veins of the leg, arm, or pelvis. These are more dangerous than clots that might form in veins on the surface of the body. Deep vein thrombosis can lead to complications if the clot breaks off and travels in the bloodstream to the lungs.     CAUSES  Blood clots form in a vein for different reasons. Usually several things cause blood clots. They include:   · The flow of blood slows down.   · The inside of the vein is damaged in some way.   · The person has a condition that makes blood clot more easily. These conditions may include:  · Older age (especially over 75 years old).  · Having a history of blood clots.  · Having major or lengthy surgery. Hip surgery is particularly high-risk.   · Breaking a hip or leg.  · Sitting or lying still for a long time.  · Cancer or cancer treatment.  · Having a long, thin tube (catheter) placed inside a vein during a medical procedure.   · Being overweight (obese).  · Pregnancy and childbirth.  · Medicines with estrogen.  · Smoking.  · Other circulation or heart  problems.     SYMPTOMS  When a clot forms, it can either partially or totally block the blood flow in that vein. Symptoms of a DVT can include:  · Swelling of the leg or arm, especially if one side is much worse.  · Warmth and redness of the leg or arm, especially if one side is much worse.   · Pain in an arm or leg. If the clot is in the leg, symptoms may be more noticeable or worse when standing or walking.  If the blood clot travels to the lung, it may cause:  · Shortness of breath.  · Chest pain. The pain may be worsened by deep breaths.   · Coughing up thick mucus (phlegm), possibly flecked with blood.   Anyone with these symptoms should get emergency medical treatment right away. Call your local emergency  Services (911 in [place] if you have these symptoms.     DIAGNOSIS  If a DVT is suspected, your caregiver will take a full medical history. He or she will also perform a physical exam. Tests that also may be required include:   · Studies of the clotting properties of the blood.   · An ultrasound scan.   · X-rays to show the flow of blood when special dye is injected into the veins (venography).   · Studies of your lungs if you have any chest symptoms.     PREVENTION  · Exercise the legs regularly. Take a brisk 30 minute walk every day.   · Maintain a weight that is appropriate for your height.  · Avoid sitting or lying in bed for long periods of time without moving your legs.   · Women, particularly those over the age of 35, should consider the risks and benefits of taking estrogen medicine, including birth control pills.   · Do not smoke, especially if you take estrogen medicines.   · Long-distance travel can increase your risk. You should exercise your legs by walking or pumping the muscles every hour.   · In hospital prevention: Prevention may include medical and non medical measures.     TREATMENT  · The most common treatment for DVT is blood thinning (anticoagulant) medicine, which reduces the blood's  tendency to clot. Anticoagulants can stop new blood clots from forming and old ones from growing. They cannot dissolve existing clots. Your body does this by itself over time. Anticoagulants can be given by mouth, by intravenous (IV) access, or by injection. Your caregiver will determine the best program for you.   · Less commonly, clot-dissolving drugs (thrombolytics) are used to dissolve a DVT. They carry a high risk of bleeding, so they are used mainly in severe cases.   · Very rarely, a blood clot in the leg needs to be removed surgically.   · If you are unable to take anticoagulants, your caregiver may arrange for you to have a filter placed in a main vein in your belly (abdomen). This filter prevents clots from traveling to your lungs.     HOME CARE INSTRUCTIONS  Take all medicines prescribed by your caregiver. Follow the directions carefully.   · You will most likely continue taking anticoagulants after you leave the hospital. Your caregiver will advise you on the length of treatment (usually 3 to 5 months, sometimes for life).   · Taking too much or too little of an anticoagulant is dangerous. While taking this type of medicine, you will need to have regular blood tests to be sure the dose is correct. The dose can change for many reasons. It is critically important that you take this medicine exactly as prescribed, and that you have blood tests exactly as directed.   · Many foods can interfere with anticoagulants. These include foods high in vitamin K, such as spinach, kale, broccoli, cabbage, hayden and turnip greens, [place] sprouts, peas, cauliflower, seaweed, parsley, beef and pork liver, green tea, and soybean oil. Your caregiver should discuss limits on these foods with you or you should arrange a visit with a dietician to answer your questions.   · Many medicines can interfere with anticoagulants. You must tell your caregiver about any and all medicines you take. This includes all vitamins and  supplements. Be especially cautious with aspirin and anti-inflammatory medicines. Ask your caregiver before taking these.   · Anticoagulants can have side effects, mostly excessive bruising or bleeding. You will need to hold pressure over cuts for longer than usual. Avoid alcoholic drinks or consume only very small amounts while taking this medicine.    If you are taking an anticoagulant:  · Wear a medical alert bracelet.  · Notify your dentist or other caregivers before procedures.  · Avoid contact sports.    · Ask your caregiver how soon you can go back to normal activities. Not being active can lead to new clots. Ask for a list of what you should and should not do.   · Exercise your lower leg muscles. This is important while traveling.   · You may need to wear compression stockings. These are tight elastic stockings that apply pressure to the lower legs. This can help keep the blood in the legs from clotting.   · If you are a smoker, you should quit.   · Learn as much as you can about DVT.     SEEK MEDICAL CARE IF:  · You have unusual bruising or any bleeding problems.  · The swelling or pain in your affected arm or leg is not gradually improving.   · You anticipate surgery or long-distance travel. You should get specific advice on DVT prevention.   · You discover other family members with blood clots. This may require further testing for inherited diseases or conditions.     SEEK IMMEDIATE MEDICAL CARE IF:  · You develop chest pain.  · You develop severe shortness of breath.  · You begin to cough up bloody mucus or phlegm (sputum).  · You feel dizzy or faint.   · You develop swelling or pain in the leg.  · You have breathing problems after traveling.    MAKE SURE YOU:  · Understand these instructions.  · Will watch your condition.  · Will get help right away if you are not doing well or get worse.       · Is patient discharged on Warfarin / Coumadin?   No     Depression / Suicide Risk    As you are discharged  from this [facility] facility, it is important to learn how to keep safe from harming yourself.    Recognize the warning signs:  · Abrupt changes in personality, positive or negative- including increase in energy   · Giving away possessions  · Change in eating patterns- significant weight changes-  positive or negative  · Change in sleeping patterns- unable to sleep or sleeping all the time   · Unwillingness or inability to communicate  · Depression  · Unusual sadness, discouragement and loneliness  · Talk of wanting to die  · Neglect of personal appearance   · Rebelliousness- reckless behavior  · Withdrawal from people/activities they love  · Confusion- inability to concentrate     If you or a loved one observes any of these behaviors or has concerns about self-harm, here's what you can do:  · Talk about it- your feelings and reasons for harming yourself  · Remove any means that you might use to hurt yourself (examples: pills, rope, extension cords, firearm)  · Get professional help from the community (Mental Health, Substance Abuse, psychological counseling)  · Do not be alone:Call your Safe Contact- someone whom you trust who will be there for you.  · Call your local CRISIS HOTLINE 017-9302 or 691-767-1700  · Call your local Children's Mobile Crisis Response Team [place] (681) 027-7087 or www.[employer] Information  · Call the toll free National Suicide Prevention Hotlines   · National Suicide Prevention Lifeline 762-539-IQTI (4680)  · National Hope Line Network 800-SUICIDE (692-9063)    Pulmonary Embolism    A pulmonary embolism (PE) is a sudden blockage or decrease of blood flow in one or both lungs. Most blockages come from a blood clot that forms in the vein of a lower leg, thigh, or arm (deep vein thrombosis, DVT) and travels to the lungs. A clot is blood that has thickened into a gel or solid. PE is a dangerous and life-threatening condition that needs to be treated right away.  What are the  causes?  This condition is usually caused by a blood clot that forms in a vein and moves to the lungs. In rare cases, it may be caused by air, fat, part of a tumor, or other tissue that moves through the veins and into the lungs.  What increases the risk?  The following factors may make you more likely to develop this condition:  · Experiencing a traumatic injury, such as breaking a hip or leg.  · Having:  ? A spinal cord injury.  ? Orthopedic surgery, especially hip or knee replacement.  ? Any major surgery.  ? A stroke.  ? DVT.  ? Blood clots or blood clotting disease.  ? Long-term (chronic) lung or heart disease.  ? Cancer treated with chemotherapy.  ? A central venous catheter.  · Taking medicines that contain estrogen. These include birth control pills and hormone replacement therapy.  · Being:  ? Pregnant.  ? In the period of time after your baby is delivered (postpartum).  ? Older than age 60.  ? Overweight.  ? A smoker, especially if you have other risks.  What are the signs or symptoms?  Symptoms of this condition usually start suddenly and include:  · Shortness of breath during activity or at rest.  · Coughing, coughing up blood, or coughing up blood-tinged mucus.  · Chest pain that is often worse with deep breaths.  · Rapid or irregular heartbeat.  · Feeling light-headed or dizzy.  · Fainting.  · Feeling anxious.  · Fever.  · Sweating.  · Pain and swelling in a leg. This is a symptom of DVT, which can lead to PE.  How is this diagnosed?  This condition may be diagnosed based on:  · Your medical history.  · A physical exam.  · Blood tests.  · CT pulmonary angiogram. This test checks blood flow in and around your lungs.  · Ventilation-perfusion scan, also called a lung VQ scan. This test measures air flow and blood flow to the lungs.  · An ultrasound of the legs.  How is this treated?  Treatment for this condition depends on many factors, such as the cause of your PE, your risk for bleeding or developing  more clots, and other medical conditions you have. Treatment aims to remove, dissolve, or stop blood clots from forming or growing larger. Treatment may include:  · Medicines, such as:  ? Blood thinning medicines (anticoagulants) to stop clots from forming.  ? Medicines that dissolve clots (thrombolytics).  · Procedures, such as:  ? Using a flexible tube to remove a blood clot (embolectomy) or to deliver medicine to destroy it (catheter-directed thrombolysis).  ? Inserting a filter into a large vein that carries blood to the heart (inferior vena cava). This filter (vena cava filter) catches blood clots before they reach the lungs.  ? Surgery to remove the clot (surgical embolectomy). This is rare.  You may need a combination of immediate, long-term (up to 3 months after diagnosis), and extended (more than 3 months after diagnosis) treatments. Your treatment may continue for several months (maintenance therapy). You and your health care provider will work together to choose the treatment program that is best for you.  Follow these instructions at home:  Medicines  · Take over-the-counter and prescription medicines only as told by your health care provider.  · If you are taking an anticoagulant medicine:  ? Take the medicine every day at the same time each day.  ? Understand what foods and drugs interact with your medicine.  ? Understand the side effects of this medicine, including excessive bruising or bleeding. Ask your health care provider or pharmacist about other side effects.  General instructions  · Wear a medical alert bracelet or carry a medical alert card that says you have had a PE and lists what medicines you take.  · Ask your health care provider when you may return to your normal activities. Avoid sitting or lying for a long time without moving.  · Maintain a healthy weight. Ask your health care provider what weight is healthy for you.  · Do not use any products that contain nicotine or tobacco, such as  cigarettes, e-cigarettes, and chewing tobacco. If you need help quitting, ask your health care provider.  · Talk with your health care provider about any travel plans. It is important to make sure that you are still able to take your medicine while on trips.  · Keep all follow-up visits as told by your health care provider. This is important.  Contact a health care provider if:  · You missed a dose of your blood thinner medicine.  Get help right away if:  · You have:  ? New or increased pain, swelling, warmth, or redness in an arm or leg.  ? Numbness or tingling in an arm or leg.  ? Shortness of breath during activity or at rest.  ? A fever.  ? Chest pain.  ? A rapid or irregular heartbeat.  ? A severe headache.  ? Vision changes.  ? A serious fall or accident, or you hit your head.  ? Stomach (abdominal) pain.  ? Blood in your vomit, stool, or urine.  ? A cut that will not stop bleeding.  · You cough up blood.  · You feel light-headed or dizzy.  · You cannot move your arms or legs.  · You are confused or have memory loss.  These symptoms may represent a serious problem that is an emergency. Do not wait to see if the symptoms will go away. Get medical help right away. Call your local emergency services (756 in the [place] Do not drive yourself to the hospital.  Summary  · A pulmonary embolism (PE) is a sudden blockage or decrease of blood flow in one or both lungs. PE is a dangerous and life-threatening condition that needs to be treated right away.  · Treatments for this condition usually include medicines to thin your blood (anticoagulants) or medicines to break apart blood clots (thrombolytics).  · If you are given blood thinners, it is important to take the medicine every day at the same time each day.  · Understand what foods and drugs interact with any medicines that you are taking.  · If you have signs of PE or DVT, call your local emergency services (430 in the [place]  This information is not intended to  replace advice given to you by your health care provider. Make sure you discuss any questions you have with your health care provider.  Document Released: 12/15/2001 Document Revised: 09/25/2019 Document Reviewed: 09/25/2019  [employer] Patient Education © 2020 [employer]      Apixaban oral tablets  What is this medicine?  APIXABAN (a PIX a ban) is an anticoagulant (blood thinner). It is used to lower the chance of stroke in people with a medical condition called atrial fibrillation. It is also used to treat or prevent blood clots in the lungs or in the veins.  This medicine may be used for other purposes; ask your health care provider or pharmacist if you have questions.  COMMON BRAND NAME(S): Eliquis  What should I tell my health care provider before I take this medicine?  They need to know if you have any of these conditions:  · antiphospholipid antibody syndrome  · bleeding disorders  · bleeding in the brain  · blood in your stools (black or tarry stools) or if you have blood in your vomit  · history of blood clots  · history of stomach bleeding  · kidney disease  · liver disease  · mechanical heart valve  · an unusual or allergic reaction to apixaban, other medicines, foods, dyes, or preservatives  · pregnant or trying to get pregnant  · breast-feeding  How should I use this medicine?  Take this medicine by mouth with a glass of water. Follow the directions on the prescription label. You can take it with or without food. If it upsets your stomach, take it with food. Take your medicine at regular intervals. Do not take it more often than directed. Do not stop taking except on your doctor's advice. Stopping this medicine may increase your risk of a blood clot. Be sure to refill your prescription before you run out of medicine.  Talk to your pediatrician regarding the use of this medicine in children. Special care may be needed.  Overdosage: If you think you have taken too much of this medicine contact a poison  control center or emergency room at once.  NOTE: This medicine is only for you. Do not share this medicine with others.  What if I miss a dose?  If you miss a dose, take it as soon as you can. If it is almost time for your next dose, take only that dose. Do not take double or extra doses.  What may interact with this medicine?  This medicine may interact with the following:  · aspirin and aspirin-like medicines  · certain medicines for fungal infections like ketoconazole and itraconazole  · certain medicines for seizures like carbamazepine and phenytoin  · certain medicines that treat or prevent blood clots like warfarin, enoxaparin, and dalteparin  · clarithromycin  · NSAIDs, medicines for pain and inflammation, like ibuprofen or naproxen  · rifampin  · ritonavir  · Micheal's wort  This list may not describe all possible interactions. Give your health care provider a list of all the medicines, herbs, non-prescription drugs, or dietary supplements you use. Also tell them if you smoke, drink alcohol, or use illegal drugs. Some items may interact with your medicine.  What should I watch for while using this medicine?  Visit your healthcare professional for regular checks on your progress. You may need blood work done while you are taking this medicine. Your condition will be monitored carefully while you are receiving this medicine. It is important not to miss any appointments.  Avoid sports and activities that might cause injury while you are using this medicine. Severe falls or injuries can cause unseen bleeding. Be careful when using sharp tools or knives. Consider using an electric razor. Take special care brushing or flossing your teeth. Report any injuries, bruising, or red spots on the skin to your healthcare professional.  If you are going to need surgery or other procedure, tell your healthcare professional that you are taking this medicine.  Wear a medical ID bracelet or chain. Carry a card that describes  your disease and details of your medicine and dosage times.  What side effects may I notice from receiving this medicine?  Side effects that you should report to your doctor or health care professional as soon as possible:  · allergic reactions like skin rash, itching or hives, swelling of the face, lips, or tongue  · signs and symptoms of bleeding such as bloody or black, tarry stools; red or dark-brown urine; spitting up blood or brown material that looks like coffee grounds; red spots on the skin; unusual bruising or bleeding from the eye, gums, or nose  · signs and symptoms of a blood clot such as chest pain; shortness of breath; pain, swelling, or warmth in the leg  · signs and symptoms of a stroke such as changes in vision; confusion; trouble speaking or understanding; severe headaches; sudden numbness or weakness of the face, arm or leg; trouble walking; dizziness; loss of coordination  This list may not describe all possible side effects. Call your doctor for medical advice about side effects. You may report side effects to FDA at 3-391-QOQ-4839.  Where should I keep my medicine?  Keep out of the reach of children.  Store at room temperature between 20 and 25 degrees C (68 and 77 degrees F). Throw away any unused medicine after the expiration date.  NOTE: This sheet is a summary. It may not cover all possible information. If you have questions about this medicine, talk to your doctor, pharmacist, or health care provider.  © 2020 [employer] (2019-08-28 17:39:34)

## 2021-06-13 NOTE — PROGRESS NOTES
Assumed care at 1915. Bedside report received from higinio RN. Patient's chart and MAR reviewed. 12 hour chart check complete. Assessment complete, pt 2/10 pain at this time, in head. Pt is awake in bed. Pt is A & O x 4. Patient was updated on plan of care for the day. Questions answered and concerns addressed.  Pt denies any additional needs at this time. White board updated. Call light, phone and personal belongings within reach. Bed alarm on and working appropriately. Vital signs stable.

## 2021-09-02 ENCOUNTER — APPOINTMENT (OUTPATIENT)
Dept: RADIOLOGY | Facility: MEDICAL CENTER | Age: 51
End: 2021-09-02
Attending: EMERGENCY MEDICINE
Payer: COMMERCIAL

## 2021-09-02 ENCOUNTER — HOSPITAL ENCOUNTER (EMERGENCY)
Facility: MEDICAL CENTER | Age: 51
End: 2021-09-02
Attending: EMERGENCY MEDICINE
Payer: COMMERCIAL

## 2021-09-02 VITALS
SYSTOLIC BLOOD PRESSURE: 149 MMHG | WEIGHT: 227.74 LBS | HEART RATE: 86 BPM | BODY MASS INDEX: 34.52 KG/M2 | TEMPERATURE: 97.8 F | RESPIRATION RATE: 16 BRPM | OXYGEN SATURATION: 96 % | HEIGHT: 68 IN | DIASTOLIC BLOOD PRESSURE: 77 MMHG

## 2021-09-02 DIAGNOSIS — M94.0 COSTOCHONDRITIS: ICD-10-CM

## 2021-09-02 LAB
ALBUMIN SERPL BCP-MCNC: 4.3 G/DL (ref 3.2–4.9)
ALBUMIN/GLOB SERPL: 1.3 G/DL
ALP SERPL-CCNC: 105 U/L (ref 30–99)
ALT SERPL-CCNC: 37 U/L (ref 2–50)
ANION GAP SERPL CALC-SCNC: 16 MMOL/L (ref 7–16)
AST SERPL-CCNC: 24 U/L (ref 12–45)
BASOPHILS # BLD AUTO: 0.2 % (ref 0–1.8)
BASOPHILS # BLD: 0.02 K/UL (ref 0–0.12)
BILIRUB SERPL-MCNC: 0.4 MG/DL (ref 0.1–1.5)
BUN SERPL-MCNC: 12 MG/DL (ref 8–22)
CALCIUM SERPL-MCNC: 10.1 MG/DL (ref 8.4–10.2)
CHLORIDE SERPL-SCNC: 101 MMOL/L (ref 96–112)
CO2 SERPL-SCNC: 23 MMOL/L (ref 20–33)
CREAT SERPL-MCNC: 0.74 MG/DL (ref 0.5–1.4)
D DIMER PPP IA.FEU-MCNC: <0.27 UG/ML (FEU) (ref 0–0.5)
EKG IMPRESSION: NORMAL
EOSINOPHIL # BLD AUTO: 0.03 K/UL (ref 0–0.51)
EOSINOPHIL NFR BLD: 0.4 % (ref 0–6.9)
ERYTHROCYTE [DISTWIDTH] IN BLOOD BY AUTOMATED COUNT: 39.5 FL (ref 35.9–50)
GLOBULIN SER CALC-MCNC: 3.2 G/DL (ref 1.9–3.5)
GLUCOSE SERPL-MCNC: 131 MG/DL (ref 65–99)
HCT VFR BLD AUTO: 45.9 % (ref 37–47)
HGB BLD-MCNC: 15.3 G/DL (ref 12–16)
IMM GRANULOCYTES # BLD AUTO: 0.03 K/UL (ref 0–0.11)
IMM GRANULOCYTES NFR BLD AUTO: 0.4 % (ref 0–0.9)
LYMPHOCYTES # BLD AUTO: 1.56 K/UL (ref 1–4.8)
LYMPHOCYTES NFR BLD: 18.8 % (ref 22–41)
MCH RBC QN AUTO: 28.3 PG (ref 27–33)
MCHC RBC AUTO-ENTMCNC: 33.3 G/DL (ref 33.6–35)
MCV RBC AUTO: 84.8 FL (ref 81.4–97.8)
MONOCYTES # BLD AUTO: 0.45 K/UL (ref 0–0.85)
MONOCYTES NFR BLD AUTO: 5.4 % (ref 0–13.4)
NEUTROPHILS # BLD AUTO: 6.21 K/UL (ref 2–7.15)
NEUTROPHILS NFR BLD: 74.8 % (ref 44–72)
NRBC # BLD AUTO: 0 K/UL
NRBC BLD-RTO: 0 /100 WBC
PLATELET # BLD AUTO: 321 K/UL (ref 164–446)
PMV BLD AUTO: 9.2 FL (ref 9–12.9)
POTASSIUM SERPL-SCNC: 4.2 MMOL/L (ref 3.6–5.5)
PROT SERPL-MCNC: 7.5 G/DL (ref 6–8.2)
RBC # BLD AUTO: 5.41 M/UL (ref 4.2–5.4)
SODIUM SERPL-SCNC: 140 MMOL/L (ref 135–145)
TROPONIN T SERPL-MCNC: 7 NG/L (ref 6–19)
WBC # BLD AUTO: 8.3 K/UL (ref 4.8–10.8)

## 2021-09-02 PROCEDURE — 84484 ASSAY OF TROPONIN QUANT: CPT

## 2021-09-02 PROCEDURE — 700111 HCHG RX REV CODE 636 W/ 250 OVERRIDE (IP): Performed by: EMERGENCY MEDICINE

## 2021-09-02 PROCEDURE — 71045 X-RAY EXAM CHEST 1 VIEW: CPT

## 2021-09-02 PROCEDURE — 93005 ELECTROCARDIOGRAM TRACING: CPT | Performed by: EMERGENCY MEDICINE

## 2021-09-02 PROCEDURE — 85379 FIBRIN DEGRADATION QUANT: CPT

## 2021-09-02 PROCEDURE — 99284 EMERGENCY DEPT VISIT MOD MDM: CPT

## 2021-09-02 PROCEDURE — 80053 COMPREHEN METABOLIC PANEL: CPT

## 2021-09-02 PROCEDURE — 85025 COMPLETE CBC W/AUTO DIFF WBC: CPT

## 2021-09-02 PROCEDURE — 96374 THER/PROPH/DIAG INJ IV PUSH: CPT

## 2021-09-02 RX ORDER — KETOROLAC TROMETHAMINE 30 MG/ML
30 INJECTION, SOLUTION INTRAMUSCULAR; INTRAVENOUS ONCE
Status: COMPLETED | OUTPATIENT
Start: 2021-09-02 | End: 2021-09-02

## 2021-09-02 RX ORDER — DEXAMETHASONE SODIUM PHOSPHATE 4 MG/ML
4 INJECTION, SOLUTION INTRA-ARTICULAR; INTRALESIONAL; INTRAMUSCULAR; INTRAVENOUS; SOFT TISSUE ONCE
Status: DISCONTINUED | OUTPATIENT
Start: 2021-09-02 | End: 2021-09-02 | Stop reason: HOSPADM

## 2021-09-02 RX ADMIN — KETOROLAC TROMETHAMINE 30 MG: 30 INJECTION, SOLUTION INTRAMUSCULAR; INTRAVENOUS at 12:36

## 2021-09-02 ASSESSMENT — FIBROSIS 4 INDEX: FIB4 SCORE: 0.73

## 2021-09-02 ASSESSMENT — PAIN DESCRIPTION - PAIN TYPE: TYPE: ACUTE PAIN

## 2021-09-02 NOTE — ED PROVIDER NOTES
ED Provider Note    CHIEF COMPLAINT  Chief Complaint   Patient presents with   • Chest Pain     hx of PE June 11th that started in her leg. Woke today with left chest pain at 4am.       HPI  Vidya Bennett is a 50 y.o. female who presents with chest discomfort that I gave her concerned because she has been treated with Eliquis for pulmonary embolism since June 11.  She does not have any new leg pain or swelling and denies any shortness of breath and is not hypoxic here.  She is compliant with her medication therapy.  She says that she has had since this morning early some discomfort in the left chest rating to her left arm but denies any sweats or cough or fever.  Denies any trauma.  Says that the left sternal border hurts the worst.  Denies any other complaints such as nausea or vomiting or abdominal pain    REVIEW OF SYSTEMS  See HPI for further details. All other systems are negative.     PAST MEDICAL HISTORY  Past Medical History:   Diagnosis Date   • Diabetes (HCC)    • DVT (deep venous thrombosis) (HCC) 06/2021    left leg   • Endometriosis    • Hypertension        FAMILY HISTORY  Family History   Problem Relation Age of Onset   • Diabetes Father    • Diabetes Brother    • Cancer Maternal Grandmother    • Diabetes Maternal Grandfather    • Heart Disease Paternal Grandfather        SOCIAL HISTORY   reports that she has never smoked. She has never used smokeless tobacco. She reports current alcohol use. She reports previous drug use.    SURGICAL HISTORY  Past Surgical History:   Procedure Laterality Date   • GYN SURGERY  04/2021    total hysterectomy   • OTHER ORTHOPEDIC SURGERY      knee repair right and left   • OTHER ORTHOPEDIC SURGERY      bone spur left ankle       CURRENT MEDICATIONS  Home Medications    **Home medications have not yet been reviewed for this encounter**         ALLERGIES  No Known Allergies    PHYSICAL EXAM  VITAL SIGNS: /77   Pulse 86   Temp 36.6 °C (97.8 °F) (Temporal)   Resp  "16   Ht 1.727 m (5' 8\")   Wt 103 kg (227 lb 11.8 oz)   SpO2 96%   BMI 34.63 kg/m²    Constitutional: Well developed, Well nourished, No acute distress, Non-toxic appearance.   HENT: Normocephalic, Atraumatic, Bilateral external ears normal, Oropharynx is clear mucous membranes are moist. No oral exudates or nasal discharge.   Eyes: Pupils are equal round and reactive, EOMI, Conjunctiva normal, No discharge.   Neck: Normal range of motion, No tenderness, Supple, No stridor. No meningismus.  Lymphatic: No lymphadenopathy noted.   Cardiovascular: Regular rate and rhythm without murmur rub or gallop.  Thorax & Lungs: Clear breath sounds bilaterally without wheezes, rhonchi or rales. There is left upper parasternal chest wall tenderness.   Abdomen: Soft non-tender non-distended. There is no rebound or guarding. No organomegaly is appreciated. Bowel sounds are normal.  Skin: Normal without rash.   Back: No CVA or spinal tenderness.   Extremities: Intact distal pulses, No edema, No tenderness, No cyanosis, No clubbing. Capillary refill is less than 2 seconds.  Musculoskeletal: Good range of motion in all major joints. No tenderness to palpation or major deformities noted.   Neurologic: Alert & oriented x 3, Normal motor function, Normal sensory function, No focal deficits noted. Reflexes are normal.  Psychiatric: Affect normal, Judgment normal, Mood normal. There is no suicidal ideation or patient reported hallucinations.     EKG  Results for orders placed or performed during the hospital encounter of 21   EKG   Result Value Ref Range    Report       [facility] Emergency Dept.    Test Date:  2021  Pt Name:    RACHEL SMITH             Department: [facility]  MRN:        [id number]                      Room:  Gender:     Female                       Technician: [id number]  :        1970                   Requested By:ER TRIAGE PROTOCOL  Order #:    [id number]                    Reading " MD: TREVOR LEÓN MD    Measurements  Intervals                                Axis  Rate:       89                           P:          68  ID:         [id number]                          QRS:        -38  QRSD:       102                          T:          13  QT:         372  QTc:        453    Interpretive Statements  Sinus rhythm  Left axis deviation  Low voltage, precordial leads  RSR' in V1 or V2, probably normal variant  Baseline wander in lead(s) I,II,aVR  Compared to ECG 06/11/2021 18:45:22  Low QRS voltage now present  RSR' in V1 or V2 now present  Electronically Signed On 9-2-2021 13:28:08 PDT by TREVOR LEÓN MD           RADIOLOGY/PROCEDURES  DX-CHEST-PORTABLE (1 VIEW)   Final Result      No evidence of acute cardiopulmonary process.            COURSE & MEDICAL DECISION MAKING  Pertinent Labs & Imaging studies reviewed. (See chart for details)  Patient presents with atypical symptoms but I was concerned about the possibility of acute coronary syndrome and therefore ordered EKG which shows no evidence of acute dysrhythmia or ischemic changes.    Portable chest x-ray shows no evidence of acute airspace disease, mass, effusion    Laboratory evaluation is also unremarkable showing normal D-dimer, no leukocytosis, significant shift, anemia, electrolyte derangements or acidosis.  Troponins unremarkable.    I gave the patient 4 mg of Decadron and 30 mg of Toradol for what I believe is costochondritis.  I doubt acute coronary syndrome.  She is encouraged to keep taking her Eliquis.  She should have significant improvement by tomorrow and she will return if any significant change in symptoms occur    FINAL IMPRESSION  1. Costochondritis             Electronically signed by: Trevor León M.D., 9/2/2021 1:49 PM

## 2021-09-02 NOTE — ED TRIAGE NOTES
Chief Complaint   Patient presents with   • Chest Pain     hx of PE June 11th that started in her leg. Woke today with left chest pain at 4am.   EKG done on arrival.